# Patient Record
Sex: MALE | Race: WHITE | NOT HISPANIC OR LATINO | Employment: FULL TIME | URBAN - METROPOLITAN AREA
[De-identification: names, ages, dates, MRNs, and addresses within clinical notes are randomized per-mention and may not be internally consistent; named-entity substitution may affect disease eponyms.]

---

## 2018-08-26 ENCOUNTER — APPOINTMENT (OUTPATIENT)
Dept: CT IMAGING | Facility: HOSPITAL | Age: 48
End: 2018-08-26

## 2018-08-26 ENCOUNTER — HOSPITAL ENCOUNTER (EMERGENCY)
Facility: HOSPITAL | Age: 48
Discharge: HOME | End: 2018-08-26
Attending: EMERGENCY MEDICINE | Admitting: EMERGENCY MEDICINE

## 2018-08-26 ENCOUNTER — APPOINTMENT (OUTPATIENT)
Dept: GENERAL RADIOLOGY | Facility: HOSPITAL | Age: 48
End: 2018-08-26

## 2018-08-26 VITALS
SYSTOLIC BLOOD PRESSURE: 142 MMHG | BODY MASS INDEX: 24.69 KG/M2 | TEMPERATURE: 99 F | DIASTOLIC BLOOD PRESSURE: 92 MMHG | OXYGEN SATURATION: 96 % | RESPIRATION RATE: 16 BRPM | HEIGHT: 71 IN | HEART RATE: 83 BPM | WEIGHT: 176.4 LBS
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
## 2018-08-26 DIAGNOSIS — S22.31XA CLOSED FRACTURE OF ONE RIB OF RIGHT SIDE, INITIAL ENCOUNTER: ICD-10-CM

## 2018-08-26 PROCEDURE — 70450 CT HEAD/BRAIN W/O DYE: CPT

## 2018-08-26 PROCEDURE — 99283 EMERGENCY DEPT VISIT LOW MDM: CPT

## 2018-08-26 PROCEDURE — 71101 X-RAY EXAM UNILAT RIBS/CHEST: CPT

## 2018-08-26 RX ORDER — METAXALONE 800 MG/1
800 TABLET ORAL 3 TIMES DAILY PRN
Qty: 12 TABLET | Refills: 0 | Status: SHIPPED | OUTPATIENT
Start: 2018-08-26 | End: 2020-10-07

## 2018-08-26 RX ORDER — HYDROCODONE BITARTRATE AND ACETAMINOPHEN 5; 325 MG/1; MG/1
1 TABLET ORAL EVERY 4 HOURS PRN
Qty: 16 TABLET | Refills: 0 | Status: SHIPPED | OUTPATIENT
Start: 2018-08-26 | End: 2020-10-07

## 2019-11-08 ENCOUNTER — TELEPHONE (OUTPATIENT)
Dept: GASTROENTEROLOGY | Facility: CLINIC | Age: 49
End: 2019-11-08

## 2019-11-08 DIAGNOSIS — Z12.11 ENCOUNTER FOR SCREENING FOR MALIGNANT NEOPLASM OF COLON: Primary | ICD-10-CM

## 2020-10-03 ENCOUNTER — FLU SHOT (OUTPATIENT)
Dept: FAMILY MEDICINE CLINIC | Facility: CLINIC | Age: 50
End: 2020-10-03

## 2020-10-03 DIAGNOSIS — Z23 NEED FOR INFLUENZA VACCINATION: ICD-10-CM

## 2020-10-03 PROCEDURE — 90471 IMMUNIZATION ADMIN: CPT | Performed by: FAMILY MEDICINE

## 2020-10-03 PROCEDURE — 90686 IIV4 VACC NO PRSV 0.5 ML IM: CPT | Performed by: FAMILY MEDICINE

## 2020-10-07 ENCOUNTER — PREP FOR SURGERY (OUTPATIENT)
Dept: OTHER | Facility: HOSPITAL | Age: 50
End: 2020-10-07

## 2020-10-07 ENCOUNTER — OFFICE VISIT (OUTPATIENT)
Dept: GASTROENTEROLOGY | Facility: CLINIC | Age: 50
End: 2020-10-07

## 2020-10-07 ENCOUNTER — HOSPITAL ENCOUNTER (OUTPATIENT)
Dept: CT IMAGING | Facility: HOSPITAL | Age: 50
Discharge: HOME | End: 2020-10-07
Admitting: NURSE PRACTITIONER

## 2020-10-07 VITALS
RESPIRATION RATE: 16 BRPM | TEMPERATURE: 97.3 F | HEIGHT: 71 IN | SYSTOLIC BLOOD PRESSURE: 118 MMHG | HEART RATE: 75 BPM | BODY MASS INDEX: 27.51 KG/M2 | WEIGHT: 196.5 LBS | DIASTOLIC BLOOD PRESSURE: 70 MMHG | OXYGEN SATURATION: 99 %

## 2020-10-07 DIAGNOSIS — R19.5 LOOSE STOOLS: ICD-10-CM

## 2020-10-07 DIAGNOSIS — R10.32 ABDOMINAL PAIN, ACUTE, LEFT LOWER QUADRANT: ICD-10-CM

## 2020-10-07 DIAGNOSIS — Z87.19 HISTORY OF DIVERTICULITIS: ICD-10-CM

## 2020-10-07 DIAGNOSIS — R10.31 ABDOMINAL PAIN, ACUTE, RIGHT LOWER QUADRANT: ICD-10-CM

## 2020-10-07 DIAGNOSIS — Z12.11 COLON CANCER SCREENING: ICD-10-CM

## 2020-10-07 DIAGNOSIS — K57.90 DIVERTICULOSIS: Primary | ICD-10-CM

## 2020-10-07 DIAGNOSIS — K57.90 DIVERTICULOSIS: ICD-10-CM

## 2020-10-07 PROCEDURE — 74177 CT ABD & PELVIS W/CONTRAST: CPT

## 2020-10-07 PROCEDURE — 0 DIATRIZOATE MEGLUMINE & SODIUM PER 1 ML: Performed by: NURSE PRACTITIONER

## 2020-10-07 PROCEDURE — 99214 OFFICE O/P EST MOD 30 MIN: CPT | Performed by: NURSE PRACTITIONER

## 2020-10-07 PROCEDURE — 25010000002 IOPAMIDOL 61 % SOLUTION: Performed by: NURSE PRACTITIONER

## 2020-10-07 RX ORDER — VALSARTAN AND HYDROCHLOROTHIAZIDE 320; 25 MG/1; MG/1
1 TABLET, FILM COATED ORAL DAILY
COMMUNITY
Start: 2020-08-20 | End: 2023-02-16 | Stop reason: SDUPTHER

## 2020-10-07 RX ORDER — AMOXICILLIN AND CLAVULANATE POTASSIUM 875; 125 MG/1; MG/1
1 TABLET, FILM COATED ORAL 2 TIMES DAILY
Qty: 20 TABLET | Refills: 0 | Status: SHIPPED | OUTPATIENT
Start: 2020-10-07 | End: 2020-10-17

## 2020-10-07 RX ORDER — AMOXICILLIN AND CLAVULANATE POTASSIUM 875; 125 MG/1; MG/1
1 TABLET, FILM COATED ORAL 2 TIMES DAILY
Status: DISCONTINUED | OUTPATIENT
Start: 2020-10-07 | End: 2020-10-07

## 2020-10-07 RX ORDER — ASCORBIC ACID 500 MG
500 TABLET ORAL DAILY
COMMUNITY

## 2020-10-07 RX ADMIN — IOPAMIDOL 85 ML: 612 INJECTION, SOLUTION INTRAVENOUS at 10:18

## 2020-10-07 RX ADMIN — DIATRIZOATE MEGLUMINE AND DIATRIZOATE SODIUM 30 ML: 660; 100 LIQUID ORAL; RECTAL at 09:10

## 2020-10-07 NOTE — PROGRESS NOTES
"Follow-up      HPI  49-year-old male presents the office today for follow-up.  He was a patient in our previous practice and was last seen in office on 8/19/2019.  Has a history of left lower quadrant abdominal pain, diverticulosis, and was previously treated empirically for possible diverticulitis with Augmentin in August 2019. He has never had a colonoscopy.    The patient reports a new onset of left lower quadrant right lower quadrant abdominal pain that started \"a few days ago\" that is gradually worsening.  He describes the pain as crampy and states that it mimics the previous pain he had August 2019 with diverticulitis. Pain does not radiate. He denies any alleviating or worsening factors for the pain.  He denies fever.  He was prescribed Augmentin last August with these same symptoms with complete resolution of symptoms.  He has not eaten anything this morning and states he drank some orange juice around 6:40 AM.    He had planned to schedule a colonoscopy both for follow-up after his diverticulitis and for colon cancer screening, but has not yet completed this  He reports that over the past several months that his stools have been of loose consistency, but not watery.  He denies weight loss. He denies any nocturnal stools.  He denies any melena or hematochezia.  He does not take a daily fiber supplement, but states that he eats salads and cereal regularly.  He denies any family history of colon cancer.    Denies having any heartburn, reflux, nausea, vomiting, or dysphasia.    Review of Systems   Constitutional: Negative for appetite change, chills, diaphoresis, fatigue, fever and unexpected weight change.   HENT: Negative for dental problem, ear pain, mouth sores, rhinorrhea, sore throat and voice change.    Eyes: Negative for pain, redness and visual disturbance.   Respiratory: Negative for cough, chest tightness and wheezing.    Cardiovascular: Negative for chest pain, palpitations and leg swelling. "   Endocrine: Negative for cold intolerance, heat intolerance, polydipsia, polyphagia and polyuria.   Genitourinary: Negative for dysuria, frequency, hematuria and urgency.   Musculoskeletal: Negative for arthralgias, back pain, joint swelling, myalgias and neck pain.   Skin: Negative for rash.   Allergic/Immunologic: Negative for environmental allergies, food allergies and immunocompromised state.   Neurological: Negative for dizziness, seizures, weakness, numbness and headaches.   Hematological: Does not bruise/bleed easily.   Psychiatric/Behavioral: Negative for sleep disturbance. The patient is not nervous/anxious.           Problem List:  There is no problem list on file for this patient.      Medical History:    Past Medical History:   Diagnosis Date   • Hyperlipidemia    • Hypertension         Social History:    Social History     Socioeconomic History   • Marital status:      Spouse name: Not on file   • Number of children: Not on file   • Years of education: Not on file   • Highest education level: Not on file   Tobacco Use   • Smoking status: Never Smoker   • Smokeless tobacco: Never Used   Substance and Sexual Activity   • Alcohol use: No     Comment: very rarely   • Drug use: No   • Sexual activity: Defer       Family History:   Family History   Problem Relation Age of Onset   • Prostate cancer Father    • Colon cancer Neg Hx    • Colon polyps Neg Hx        Surgical History:   Past Surgical History:   Procedure Laterality Date   • HERNIA REPAIR           Current Outpatient Medications:   •  Multiple Vitamins-Minerals (MULTIVITAMIN ADULT PO), Take  by mouth., Disp: , Rfl:   •  rosuvastatin (CRESTOR) 10 MG tablet, Take 10 mg by mouth Daily., Disp: , Rfl:   •  valsartan-hydrochlorothiazide (DIOVAN-HCT) 320-25 MG per tablet, Take 1 tablet by mouth Daily., Disp: , Rfl:   •  vitamin C (ASCORBIC ACID) 500 MG tablet, Take 500 mg by mouth Daily., Disp: , Rfl:   •  loperamide (IMODIUM A-D) 2 MG tablet, Take  two tabs now and then one after each loose stool, up to 8 doses in 24 hours, Disp: 24 tablet, Rfl: 0    Current Facility-Administered Medications:   •  amoxicillin-clavulanate (AUGMENTIN) 875-125 MG per tablet 1 tablet, 1 tablet, Oral, BID, Neftali NicoleTERRI    Allergies: No Known Allergies     The following portions of the patient's history were reviewed and updated as appropriate: allergies, current medications, past family history, past medical history, past social history, past surgical history and problem list.    Vitals:    10/07/20 0818   BP: 118/70   Pulse: 75   Resp: 16   Temp: 97.3 °F (36.3 °C)   SpO2: 99%       Physical Exam  Vitals signs reviewed.   Constitutional:       Appearance: Normal appearance.   Pulmonary:      Effort: Pulmonary effort is normal. No respiratory distress.   Abdominal:      General: Abdomen is flat. Bowel sounds are normal. There is no distension.      Palpations: Abdomen is soft. There is no mass.      Tenderness: There is abdominal tenderness. There is no guarding or rebound.      Comments: Mild to moderate tenderness noted on palpation of left lower quadrant right lower quadrant, with discomfort being more intense in the left lower quadrant.   Musculoskeletal: Normal range of motion.   Skin:     General: Skin is warm and dry.   Neurological:      General: No focal deficit present.      Mental Status: He is alert and oriented to person, place, and time.   Psychiatric:         Mood and Affect: Mood normal.         Behavior: Behavior normal.         Thought Content: Thought content normal.         Judgment: Judgment normal.         Assessment/ Plan  Jasbir was seen today for follow-up.    Diagnoses and all orders for this visit:    Diverticulosis  -     CT Abdomen Pelvis With Contrast; Future  -     amoxicillin-clavulanate (AUGMENTIN) 875-125 MG per tablet 1 tablet    Abdominal pain, acute, left lower quadrant  -     CT Abdomen Pelvis With Contrast; Future  -      amoxicillin-clavulanate (AUGMENTIN) 875-125 MG per tablet 1 tablet    Abdominal pain, acute, right lower quadrant  -     CT Abdomen Pelvis With Contrast; Future  -     amoxicillin-clavulanate (AUGMENTIN) 875-125 MG per tablet 1 tablet    Colon cancer screening    Loose stools  -     CT Abdomen Pelvis With Contrast; Future    History of diverticulitis  -     CT Abdomen Pelvis With Contrast; Future  -     amoxicillin-clavulanate (AUGMENTIN) 875-125 MG per tablet 1 tablet         No follow-ups on file.  1.  For further evaluation of acute left lower quadrant and acute right lower quadrant abdominal pain we will obtain a stat CT scan of the abdomen and pelvis today.  We will contact you with results once available.    2.  Due to your history of diverticulitis, we will place you on a course of Augmentin.  You will take 1 tablet twice daily x10 days.  This prescription has been sent to your pharmacy.    3.  For ongoing management of diverticulosis, we recommend the addition of a daily fiber supplement such as FiberCon tablet or his generic equivalent.  We recommend starting off with 1 tablet a day with a full 8 ounce glass of water x1 week, then may increase to 2 tablets daily based upon how your bowel habits respond.    4.  We will plan to schedule colonoscopy in 6 to 8 weeks both for follow-up due to your history of diverticulosis and diverticulitis as well as for colon cancer screening.    5.  Plan for office follow-up with nurse practitioner 4 weeks after procedure to discuss results and plan of care moving forward.      Discussion:  The patient has had a history of left lower quadrant diverticulitis, and states that his current symptoms mimic the symptoms he experienced August 2019.  He was placed on a course of Augmentin which resolved his symptoms.  He is due for his screening colonoscopy and states that due to COVID-19, he has postponed this.  We will obtain a stat CT scan of the abdomen and pelvis today for  further evaluation of left lower quadrant and right lower quadrant abdominal pain and we will place the patient on a course of Augmentin twice daily x10 days for treatment.  We will plan to proceed with colonoscopy in 6 to 8 weeks for colon cancer screening as well as for further evaluation of left lower quadrant abdominal pain right lower quadrant abdominal pain, and history of diverticulitis in conjunction with loose stools.  Plan for office follow-up with nurse practitioner 4 weeks after procedure.  Patient verbalized understanding of above.  All questions answered and support provided.

## 2020-10-07 NOTE — PATIENT INSTRUCTIONS
1.  For further evaluation of acute left lower quadrant and acute right lower quadrant abdominal pain we will obtain a stat CT scan of the abdomen and pelvis today.  We will contact you with results once available.    2.  Due to your history of diverticulitis, we will place you on a course of Augmentin.  You will take 1 tablet twice daily x10 days.  This prescription has been sent to your pharmacy.    3.  For ongoing management of diverticulosis, we recommend the addition of a daily fiber supplement such as FiberCon tablet or his generic equivalent.  We recommend starting off with 1 tablet a day with a full 8 ounce glass of water x1 week, then may increase to 2 tablets daily based upon how your bowel habits respond.    4.  We will plan to schedule colonoscopy in 6 to 8 weeks both for follow-up due to your history of diverticulosis and diverticulitis as well as for colon cancer screening.    5.  Plan for office follow-up with nurse practitioner 4 weeks after procedure to discuss results and plan of care moving forward.

## 2020-10-26 ENCOUNTER — TRANSCRIBE ORDERS (OUTPATIENT)
Dept: ADMINISTRATIVE | Facility: HOSPITAL | Age: 50
End: 2020-10-26

## 2020-10-26 DIAGNOSIS — I67.1 BRAIN ANEURYSM: Primary | ICD-10-CM

## 2020-10-26 DIAGNOSIS — K76.9 LIVER LESION: ICD-10-CM

## 2020-10-26 DIAGNOSIS — N28.1 BILATERAL RENAL CYSTS: ICD-10-CM

## 2020-11-06 ENCOUNTER — CLINICAL SUPPORT (OUTPATIENT)
Dept: GENETICS | Facility: HOSPITAL | Age: 50
End: 2020-11-06

## 2020-11-06 DIAGNOSIS — Q61.3 POLYCYSTIC KIDNEY DISEASE: Primary | ICD-10-CM

## 2020-11-09 ENCOUNTER — TRANSCRIBE ORDERS (OUTPATIENT)
Dept: ADMINISTRATIVE | Facility: HOSPITAL | Age: 50
End: 2020-11-09

## 2020-11-09 DIAGNOSIS — K76.89 LIVER CYST: ICD-10-CM

## 2020-11-09 DIAGNOSIS — I67.1 BRAIN ANEURYSM: Primary | ICD-10-CM

## 2020-11-09 DIAGNOSIS — N28.1 BILATERAL RENAL CYSTS: ICD-10-CM

## 2020-11-09 NOTE — PROGRESS NOTES
Jasbir Banks is a 50 year old male who was referred was genetic counseling to discuss genetic testing for Autosomal Dominant Polycystic Kidney Disease (ADPKD).  Genetic counseling was provided via telehealth.  Mr. Banks was recently identified to have multiple bilateral renal cysts, as well as liver cysts. Mr. Banks was interested in discussing genetic testing options and the information that would be gained by genetic testing.  Genetic testing was ordered through the [facility] PKD panel, which analyzes seven genes associated with polycystic kidney disease (GANAB, HNF1B, PKD1, PKD2, PKHD1, PRKCSH, TSC2).  A saliva sample collection kit was ordered to be sent to Mr. Banks, and once the sample is sent in results are expected in 4-6 weeks.      FAMILY HISTORY:    No known family history of PKD or individuals with renal or liver cysts.        GENETIC COUNSELING:  We discussed that autosomal dominant polycystic kidney disease (ADPKD) is inherited in an autosomal dominant fashion and is an adult disorder characterized by bilateral renal cysts, cysts in other organs, vascular abnormalities, mitral valve prolapse, and abdominal wall hernias.  Typically, the kidney’s appear enlarged in individuals with AKPKD.  Approximately 10% of cases of ADPKD are de nadeem, meaning that they are not inherited from a parent. We discussed the variability of ADPKD in both the age of presentation as well as the severity of symptoms.   We discussed genetic testing options.  PKD1 and PKD2 are the genes associated with the majority of cases of ADPKD.  Studies have shown that about 7-9% of families with a clinical diagnosis of ADPKD do not have a mutation identified in either gene, therefore negative genetic testing does not rule out a diagnosis of ADPKD.  It is unclear whether the group without an identified mutation in PKD1 or PKD2 is due to the possibility of an unidentified gene that may be causative or due to technical limitations in  testing for the currently identified genes.  Therefore, if Mr. Banks’s genetic test results were negative, it would not rule out the diagnosis of ADPKD.  If a mutation is identified, Mr. Banks’s children could then be tested for that specific mutation.  If his children tested negative for an identified mutation, this would be considered a “true negative” result.   In that case, Mr. Banks’s children would not need to be followed by renal ultrasound.   If Mr. Banks’s genetic testing does not identify a mutation, genetic testing would not be indicated for his children, however they would still need to be followed based on his history.    PLAN:  Mr. Banks elected to pursue genetic testing through the comprehensive [facility] PKD panel.   He will be contacted by telephone once results are available.  Genetic counseling remains available to Mr. Banks and he is welcome to contact us with any questions or concerns.        Mitzi Merino MS, [facility] [place], [facility]  Licensed Certified Genetic Counselor

## 2020-11-11 ENCOUNTER — APPOINTMENT (OUTPATIENT)
Dept: ULTRASOUND IMAGING | Facility: HOSPITAL | Age: 50
End: 2020-11-11

## 2020-11-11 ENCOUNTER — APPOINTMENT (OUTPATIENT)
Dept: MRI IMAGING | Facility: HOSPITAL | Age: 50
End: 2020-11-11

## 2020-11-11 ENCOUNTER — HOSPITAL ENCOUNTER (OUTPATIENT)
Dept: MRI IMAGING | Facility: HOSPITAL | Age: 50
Discharge: HOME | End: 2020-11-11
Admitting: FAMILY MEDICINE

## 2020-11-11 DIAGNOSIS — I67.1 BRAIN ANEURYSM: ICD-10-CM

## 2020-11-11 PROCEDURE — 70544 MR ANGIOGRAPHY HEAD W/O DYE: CPT

## 2020-11-18 ENCOUNTER — HOSPITAL ENCOUNTER (OUTPATIENT)
Dept: ULTRASOUND IMAGING | Facility: HOSPITAL | Age: 50
Discharge: HOME | End: 2020-11-18

## 2020-11-18 DIAGNOSIS — K76.89 LIVER CYST: ICD-10-CM

## 2020-11-18 DIAGNOSIS — N28.1 BILATERAL RENAL CYSTS: ICD-10-CM

## 2020-11-18 PROCEDURE — 76705 ECHO EXAM OF ABDOMEN: CPT

## 2020-11-18 PROCEDURE — 76775 US EXAM ABDO BACK WALL LIM: CPT

## 2020-12-10 ENCOUNTER — LAB REQUISITION (OUTPATIENT)
Dept: LAB | Facility: HOSPITAL | Age: 50
End: 2020-12-10

## 2020-12-10 DIAGNOSIS — Z00.00 ENCOUNTER FOR GENERAL ADULT MEDICAL EXAMINATION WITHOUT ABNORMAL FINDINGS: ICD-10-CM

## 2020-12-12 PROCEDURE — U0004 COV-19 TEST NON-CDC HGH THRU: HCPCS | Performed by: INTERNAL MEDICINE

## 2020-12-14 LAB — SARS-COV-2 RNA RESP QL NAA+PROBE: NOT DETECTED

## 2020-12-15 ENCOUNTER — OUTSIDE FACILITY SERVICE (OUTPATIENT)
Dept: GASTROENTEROLOGY | Facility: CLINIC | Age: 50
End: 2020-12-15

## 2020-12-15 ENCOUNTER — LAB REQUISITION (OUTPATIENT)
Dept: LAB | Facility: HOSPITAL | Age: 50
End: 2020-12-15

## 2020-12-15 DIAGNOSIS — K57.90 DIVERTICULOSIS OF INTESTINE, PART UNSPECIFIED, WITHOUT PERFORATION OR ABSCESS WITHOUT BLEEDING: ICD-10-CM

## 2020-12-15 PROCEDURE — 45380 COLONOSCOPY AND BIOPSY: CPT | Performed by: INTERNAL MEDICINE

## 2020-12-15 PROCEDURE — 88305 TISSUE EXAM BY PATHOLOGIST: CPT | Performed by: INTERNAL MEDICINE

## 2020-12-16 LAB
CYTO UR: NORMAL
LAB AP CASE REPORT: NORMAL
LAB AP CLINICAL INFORMATION: NORMAL
PATH REPORT.FINAL DX SPEC: NORMAL
PATH REPORT.GROSS SPEC: NORMAL

## 2021-01-12 ENCOUNTER — DOCUMENTATION (OUTPATIENT)
Dept: GENETICS | Facility: HOSPITAL | Age: 51
End: 2021-01-12

## 2021-01-15 NOTE — PROGRESS NOTES
Jasbir Banks is a 50-year-old male who was referred was genetic counseling to discuss genetic testing for Autosomal Dominant Polycystic Kidney Disease (ADPKD).  Genetic counseling was provided via telehealth.  Mr. Banks was recently identified to have bilateral renal cysts, as well as a liver cyst. Mr. Banks was interested in discussing genetic testing options and the information that would be gained by genetic testing.  Genetic testing was ordered through the [facility] PKD panel, which analyzes seven genes associated with polycystic kidney disease (GANAB, HNF1B, PKD1, PKD2, PKHD1, PRKCSH, TSC2).  Genetic testing was negative for known pathogenic mutations in any of the genes analyzed, however a variant of uncertain significance (VUS) was reported in the PKD1 gene (reviewed below in the results section).  These results were discussed with Mr. Banks on 1/12/21.      FAMILY HISTORY:  No known family history of PKD or individuals known to have renal or liver cysts.        GENETIC COUNSELING:  We discussed that autosomal dominant polycystic kidney disease (ADPKD) is is an adult onset disorder characterized by bilateral renal cysts, cysts in other organs, vascular abnormalities, mitral valve prolapse, and abdominal wall hernias.  Typically, the kidneys appear enlarged in individuals with AKPKD.  Approximately 10% of cases of ADPKD are de nadeem, meaning that the causative mutation is not inherited from a parent, but the result of a new mutation in the egg or sperm from a parent, or a mutation arising early in embryogenesis. We discussed the variability of ADPKD in both the age of presentation as well as the severity of symptoms.  We discussed genetic testing options.  PKD1 and PKD2 are the genes associated with the majority of cases of ADPKD.  Studies have shown that about 7-9% of families with a clinical diagnosis of ADPKD do not have a mutation identified in either gene, therefore negative genetic testing does  not rule out a diagnosis of ADPKD.  It is unclear whether the group without an identified mutation is due to the possibility of other unidentified genes that may be causative, or due to technical limitations in testing for the currently identified genes.   A clinical diagnosis is established in someone over age 40 with two or more cysts in each kidney if they also have a known affected first degree relative, or an identified pathogenic mutation in an ADPKD gene.   Mr. Banks meets the cyst criteria based on number, but either a positive family history or an identified pathogenic mutation would be needed to establish a clinical diagnosis.  If a mutation is identified, relatives can then be tested for that specific mutation to determine if they have inherited it. When genetic testing is negative in an affected individual, genetic testing is not indicated for relatives, however screening recommendations may still be made based on family history.    GENETIC TESTING:  The risks, benefits and limitations of genetic testing and implications for clinical management following testing were reviewed.  DNA test results can influence decisions regarding screening and testing for relatives.  Genetic testing can have psychological implications for both individuals and families.  Also discussed was the possibility of employment and insurance discrimination based on genetic test results and the laws in place to prevent this (ANGEL).     We discussed multigene panel testing, which would involve testing six genes associated with polycystic kidney disease. The benefits and limitations of genetic testing were discussed and Mr. Banks decided to pursue testing via the panel. The implications of a positive or negative test result were discussed. We discussed the possibility that, in some cases, genetic test results may be uninformative or may be ambiguous due to the identification of a genetic variant of uncertain significance (VUS).  VUSs are differences within genes that may or may not impact the function of a gene.  VUSs are relatively frequently reported through multigene panel testing, given the number of genes analyzed and the presence of genetic variation in the population.    RESULTS:  A variant of uncertain significance (VUS) was identified in the PKD1 gene (c.[id number] A>G).   A VUS is a difference within a gene for which there is not sufficient evidence to classify as benign or pathogenic.  The identification of a VUS does not confirm a diagnosis and VUSs are not clinically actionable results. There are not alternative interpretations of this specific VUS in ClinVar, the [employer] database that tracks variant classification.  [facility] does not have a calculated percentage of PKD1 VUSs that are reclassified as pathogenic or likely pathogenic.  [facility] does offer the option of parental studies to determine if the VUS is de nadeem or was inherited from a parent.  [facility] would offer this single site testing at no cost to Mr. Banks’s parents, and this could be done through a buccal sample that could be mailed directly to them.  If a VUS is found to be de nadeem, it is considered moderate strength criteria for pathogenicity.   However, reclassification of a VUS depends on multiple factors and it can often take significant time for a reclassification to occur.  [facility] does follow the AMP/ACMG guidelines for variant classification.  If a reclassification is made, a new report will be released by [facility], and I would contact Mr. Banks at that time.  Testing Mr. Banks’s children or brother for the identified VUS is not recommended, since the presence or absence or a VUS should not be used in guiding management.        PLAN:  Genetic counseling remains available to Mr. Banks and he is welcome to contact us with any questions or concerns.  I would be happy to help in the coordination of parental testing, should Mr. Banks’s family decide to pursue that  option.      Mitzi Merino MS, [facility] [place], [facility]  Licensed Certified Genetic Counselor     Cc: Jasbir Rodrigues MD

## 2021-02-02 ENCOUNTER — OFFICE VISIT (OUTPATIENT)
Dept: GASTROENTEROLOGY | Facility: CLINIC | Age: 51
End: 2021-02-02

## 2021-02-02 VITALS
OXYGEN SATURATION: 99 % | TEMPERATURE: 97.8 F | HEART RATE: 58 BPM | BODY MASS INDEX: 27.72 KG/M2 | WEIGHT: 198 LBS | HEIGHT: 71 IN | DIASTOLIC BLOOD PRESSURE: 72 MMHG | SYSTOLIC BLOOD PRESSURE: 130 MMHG

## 2021-02-02 DIAGNOSIS — Z12.11 COLON CANCER SCREENING: ICD-10-CM

## 2021-02-02 DIAGNOSIS — Z87.19 HISTORY OF DIVERTICULITIS: ICD-10-CM

## 2021-02-02 DIAGNOSIS — Z86.010 HX OF ADENOMATOUS COLONIC POLYPS: ICD-10-CM

## 2021-02-02 DIAGNOSIS — K57.90 DIVERTICULOSIS: Primary | ICD-10-CM

## 2021-02-02 DIAGNOSIS — K58.0 IRRITABLE BOWEL SYNDROME WITH DIARRHEA: ICD-10-CM

## 2021-02-02 PROCEDURE — 99214 OFFICE O/P EST MOD 30 MIN: CPT | Performed by: NURSE PRACTITIONER

## 2021-02-02 RX ORDER — DICYCLOMINE HYDROCHLORIDE 10 MG/1
10 CAPSULE ORAL 3 TIMES DAILY PRN
Qty: 90 CAPSULE | Refills: 5 | Status: SHIPPED | OUTPATIENT
Start: 2021-02-02

## 2021-02-02 NOTE — PROGRESS NOTES
Chief Complaint   Patient presents with   • Follow-up     IBS-D, diverticulosis, follow-up after colonoscopy             History of Present Illness  50-year-old male presents the office today for follow-up.  He was last seen in office on 10/7/2020.  He has a history of left lower quadrant abdominal pain, diverticulosis and possible diverticulitis.    Colonoscopy on 12/15/2020 demonstrated diverticulosis of the sigmoid and descending colon, granular mucosa in the sigmoid colon, one 2 mm polyp in the cecum, one 4 mm polyp in ascending colon, and nonbleeding internal hemorrhoids.  Terminal ileum biopsy was unremarkable.  Cecum biopsy revealed a tubular adenoma.  Ascending colon biopsy, colon biopsy, and sigmoid colon biopsy were all unremarkable.    He has had a history of diverticulitis, noted on CT scan October 2020.  He continues to follow a diet where he and he avoids nuts, seeds, and popcorn.  He reports having an average of 1-2 loose stools on most days with some accompanying fecal urgency and intermittent abdominal cramping.  He does take a daily fiber supplement.  Denies any melena or hematochezia.  His weight is stable and his appetite is good.    He denies having any heartburn, reflux, nausea, vomiting, or dysphagia.    He has a history of polycystic kidney disease and follows with nephrology as well as his PCP.  His last bilateral renal ultrasound was performed November 2020 and revealed multiple bilateral renal cysts.  He reports plans for ongoing monitoring and will have some repeat scans this summer.  He was also noted to have a 6 cm liver cyst on his liver ultrasound as noted below.    Review of Systems   HENT: Negative for trouble swallowing.    Cardiovascular: Negative for chest pain.   Gastrointestinal: Positive for diarrhea. Negative for abdominal distention, abdominal pain, anal bleeding, blood in stool, constipation, nausea, rectal pain and vomiting.         Result Review :      SCANNED -  "COLONOSCOPY (12/15/2020)  Tissue Pathology Exam (12/15/2020 11:48)  Office Visit with Nicole Lindsay APRN (10/07/2020)  US Liver (11/18/2020 15:30)  CT Abdomen Pelvis With Contrast (10/07/2020 10:23)  US Renal Bilateral (11/18/2020 15:30)      Vital Signs:   /72   Pulse 58   Temp 97.8 °F (36.6 °C)   Ht 180.3 cm (70.98\")   Wt 89.8 kg (198 lb)   SpO2 99%   BMI 27.63 kg/m²     Body mass index is 27.63 kg/m².     Physical Exam  Vitals signs reviewed.   Constitutional:       Appearance: Normal appearance.   Pulmonary:      Effort: Pulmonary effort is normal.   Abdominal:      General: Abdomen is flat. Bowel sounds are normal. There is no distension.      Palpations: Abdomen is soft.      Tenderness: There is no guarding or rebound.   Musculoskeletal: Normal range of motion.   Skin:     General: Skin is warm and dry.   Neurological:      General: No focal deficit present.      Mental Status: He is alert and oriented to person, place, and time.   Psychiatric:         Mood and Affect: Mood normal.         Behavior: Behavior normal.         Thought Content: Thought content normal.         Judgment: Judgment normal.             Assessment and Plan      Diagnoses and all orders for this visit:    1. Diverticulosis (Primary)    2. Irritable bowel syndrome with diarrhea    3. History of diverticulitis    4. Hx of adenomatous colonic polyps    5. Colon cancer screening    Other orders  -     dicyclomine (BENTYL) 10 MG capsule; Take 1 capsule by mouth 3 (Three) Times a Day As Needed (abdominal pain/diarrhea, fecal urgency).  Dispense: 90 capsule; Refill: 5  -     riFAXIMin (Xifaxan) 550 MG tablet; Take 1 tablet by mouth 3 (Three) Times a Day for 14 days.  Dispense: 42 tablet; Refill: 0           Follow Up   Return in about 7 months (around 9/2/2021).     Patient Instructions   1.  For irritable bowel with diarrhea we will place you on a 2-week course of Xifaxan.  You will take 1 tablet 3 times daily x14 days.  " Coupon co-pay card provided.  Please visit [facility] [employer] to fill out the required form to receive your medication for $0.    2.  Additionally for irritable bowel with diarrhea, a prescription for dicyclomine 10 mg tablets has been sent to your pharmacy.  You may take 1 tablet up to 3 times daily as needed for fecal urgency, diarrhea/loose stools, and abdominal cramping.  This medication works well if taken 1 hour before meals.    3.  For diverticulosis we recommend maintaining a high-fiber diet and continuing your fiber supplement.    4.  Due to your history of colon polyps, we have placed your electronic reminder system for your next surveillance colonoscopy which will be due on 12/15/2025.    5.  Please continue to follow-up with both your PCP and nephrologist for your polycystic kidney disease monitoring.    6.  Plan for 7-month follow-up for reassessment of symptoms, or sooner should symptoms worsen or fail to improve.  Hopefully by the time of your appointment, you will have had follow-up with your nephrologist and PCP for ongoing monitoring of your polycystic kidney disease.        Discussion:    Colonoscopy findings and pathology reviewed with patient today during his follow-up visit.  For IBS-D we will place him on a course of Xifaxan and have also provided him with a prescription for dicyclomine to be used intermittently for abdominal cramping, fecal urgency, and loose stools.  Patient to continue daily fiber supplement.  Due to his history of adenomatous colon polyps, he was placed in recall for his next surveillance colonoscopy December 2025.  We will plan for follow-up visit in 6 months for reassessment of symptoms, or sooner should symptoms worsen or fail to improve.  Patient verbalized understand above plan of care and is in agreement.  All questions answered and support provided.

## 2021-02-02 NOTE — PATIENT INSTRUCTIONS
1.  For irritable bowel with diarrhea we will place you on a 2-week course of Xifaxan.  You will take 1 tablet 3 times daily x14 days.  Coupon co-pay card provided.  Please visit [facility] [employer] to fill out the required form to receive your medication for $0.    2.  Additionally for irritable bowel with diarrhea, a prescription for dicyclomine 10 mg tablets has been sent to your pharmacy.  You may take 1 tablet up to 3 times daily as needed for fecal urgency, diarrhea/loose stools, and abdominal cramping.  This medication works well if taken 1 hour before meals.    3.  For diverticulosis we recommend maintaining a high-fiber diet and continuing your fiber supplement.    4.  Due to your history of colon polyps, we have placed your electronic reminder system for your next surveillance colonoscopy which will be due on 12/15/2025.    5.  Please continue to follow-up with both your PCP and nephrologist for your polycystic kidney disease monitoring.    6.  Plan for 7-month follow-up for reassessment of symptoms, or sooner should symptoms worsen or fail to improve.  Hopefully by the time of your appointment, you will have had follow-up with your nephrologist and PCP for ongoing monitoring of your polycystic kidney disease.

## 2021-03-12 ENCOUNTER — IMMUNIZATION (OUTPATIENT)
Dept: VACCINE CLINIC | Facility: HOSPITAL | Age: 51
End: 2021-03-12

## 2021-03-12 PROCEDURE — 91300 HC SARSCOV02 VAC 30MCG/0.3ML IM: CPT | Performed by: OBSTETRICS & GYNECOLOGY

## 2021-03-12 PROCEDURE — 0001A: CPT | Performed by: OBSTETRICS & GYNECOLOGY

## 2021-04-02 ENCOUNTER — IMMUNIZATION (OUTPATIENT)
Dept: VACCINE CLINIC | Facility: HOSPITAL | Age: 51
End: 2021-04-02

## 2021-04-02 PROCEDURE — 91300 HC SARSCOV02 VAC 30MCG/0.3ML IM: CPT | Performed by: OBSTETRICS & GYNECOLOGY

## 2021-04-02 PROCEDURE — 0002A: CPT | Performed by: OBSTETRICS & GYNECOLOGY

## 2021-07-13 ENCOUNTER — TRANSCRIBE ORDERS (OUTPATIENT)
Dept: ADMINISTRATIVE | Facility: HOSPITAL | Age: 51
End: 2021-07-13

## 2021-07-13 DIAGNOSIS — N28.1 RENAL CYST: Primary | ICD-10-CM

## 2021-08-09 ENCOUNTER — HOSPITAL ENCOUNTER (OUTPATIENT)
Dept: ULTRASOUND IMAGING | Facility: HOSPITAL | Age: 51
Discharge: HOME | End: 2021-08-09
Admitting: INTERNAL MEDICINE

## 2021-08-09 DIAGNOSIS — N28.1 RENAL CYST: ICD-10-CM

## 2021-08-09 PROCEDURE — 76775 US EXAM ABDO BACK WALL LIM: CPT

## 2021-08-13 DIAGNOSIS — K57.92 DIVERTICULITIS: Primary | ICD-10-CM

## 2021-08-13 RX ORDER — CIPROFLOXACIN 500 MG/1
500 TABLET, FILM COATED ORAL 2 TIMES DAILY
Qty: 20 TABLET | Refills: 0 | Status: SHIPPED | OUTPATIENT
Start: 2021-08-13 | End: 2021-09-07

## 2021-08-13 RX ORDER — METRONIDAZOLE 500 MG/1
500 TABLET ORAL 3 TIMES DAILY
Qty: 30 TABLET | Refills: 0 | Status: SHIPPED | OUTPATIENT
Start: 2021-08-13 | End: 2021-09-07

## 2021-09-07 ENCOUNTER — OFFICE VISIT (OUTPATIENT)
Dept: GASTROENTEROLOGY | Facility: CLINIC | Age: 51
End: 2021-09-07

## 2021-09-07 VITALS
SYSTOLIC BLOOD PRESSURE: 126 MMHG | WEIGHT: 190 LBS | TEMPERATURE: 97.5 F | HEART RATE: 59 BPM | OXYGEN SATURATION: 98 % | HEIGHT: 70 IN | DIASTOLIC BLOOD PRESSURE: 80 MMHG | BODY MASS INDEX: 27.2 KG/M2

## 2021-09-07 DIAGNOSIS — Z87.19 HISTORY OF DIVERTICULITIS: ICD-10-CM

## 2021-09-07 DIAGNOSIS — K57.90 DIVERTICULOSIS: Primary | Chronic | ICD-10-CM

## 2021-09-07 DIAGNOSIS — K58.0 IRRITABLE BOWEL SYNDROME WITH DIARRHEA: Chronic | ICD-10-CM

## 2021-09-07 PROCEDURE — 99213 OFFICE O/P EST LOW 20 MIN: CPT | Performed by: NURSE PRACTITIONER

## 2021-09-07 NOTE — PATIENT INSTRUCTIONS
1.  For diverticulosis and your history of diverticulitis we recommend continuing your daily fiber supplement and avoiding foods such as corn, popcorn, and nuts.    2.  For colon cancer screening due to your history of colon polyps should be due for your next surveillance colonoscopy December 2025 and we have placed you in recall accordingly.    3.  For IBS-D and intermittent diarrhea, you may continue dicyclomine as prescribed.    4.  Recommend office follow-up as needed.

## 2021-09-07 NOTE — PROGRESS NOTES
Chief Complaint   Patient presents with   • Follow-up     History of diverticulitis, diverticulosis, IBS-D           History of Present Illness  50-year-old male presents the office today for follow-up.  He was last seen in office on 2/2/2021.  He has a history of diverticulosis and diverticulitis, adenomatous colon polyps, nonbleeding internal hemorrhoids, liver cyst, and polycystic kidney disease.    He maintains a high-fiber diet due to his history of diverticulosis and diverticulitis.  His last episode of diverticulitis occurred in early August and he recovered well with use of Cipro and Flagyl.  This is the third episode he has had in his lifetime, with each episode occurring approximately 12 months apart.  Bowel habits are generally more on the loose side of the spectrum.  He denies any constipation.  He denies any melena or hematochezia.  He reports restarting his daily fiber supplement.  He also has a prescription for dicyclomine that he uses intermittently for IBS-D symptoms.  He has previously tried Xifaxan.  Due to his history of colon polyps, he will be due for his next surveillance colonoscopy December 2025.  He reports some intentional weight loss.    He follows with his PCP and nephrology for management of polycystic kidney disease.  His last renal ultrasound was performed November 2020 revealing multiple bilateral renal cysts.  Plan was for repeat scan this past summer for ongoing monitoring.    Review of Systems   Constitutional: Positive for unexpected weight change. Negative for fever.   HENT: Negative for trouble swallowing.    Cardiovascular: Negative for chest pain.   Gastrointestinal: Negative for abdominal distention, abdominal pain, anal bleeding, blood in stool, constipation, diarrhea, nausea, rectal pain and vomiting.      Result Review :       Office Visit with Nicole Lindsay APRN (02/02/2021)  SCANNED - COLONOSCOPY (12/15/2020)  Tissue Pathology Exam (12/15/2020 11:48)    Vital Signs:  "  /80   Pulse 59   Temp 97.5 °F (36.4 °C)   Ht 177.8 cm (70\")   Wt 86.2 kg (190 lb)   SpO2 98%   BMI 27.26 kg/m²     Body mass index is 27.26 kg/m².     Physical Exam  Vitals reviewed.   Constitutional:       Appearance: Normal appearance.   Pulmonary:      Effort: Pulmonary effort is normal. No respiratory distress.   Abdominal:      General: Abdomen is flat. Bowel sounds are normal. There is no distension.      Palpations: Abdomen is soft. There is no mass.      Tenderness: There is no abdominal tenderness. There is no guarding.   Musculoskeletal:         General: Normal range of motion.   Skin:     General: Skin is warm and dry.   Neurological:      General: No focal deficit present.      Mental Status: He is alert and oriented to person, place, and time.   Psychiatric:         Mood and Affect: Mood normal.         Behavior: Behavior normal.         Thought Content: Thought content normal.         Judgment: Judgment normal.       Assessment and Plan    Diagnoses and all orders for this visit:    1. Diverticulosis (Primary)    2. History of diverticulitis    3. Irritable bowel syndrome with diarrhea           Patient Instructions   1.  For diverticulosis and your history of diverticulitis we recommend continuing your daily fiber supplement and avoiding foods such as corn, popcorn, and nuts.    2.  For colon cancer screening due to your history of colon polyps should be due for your next surveillance colonoscopy December 2025 and we have placed you in recall accordingly.    3.  For IBS-D and intermittent diarrhea, you may continue dicyclomine as prescribed.    4.  Recommend office follow-up as needed.     Discussion:    Discussed efforts towards reducing incidence of diverticulitis.  Patient recovered well from previous episode with Cipro and Flagyl.  He has had a total of 3 episodes, each occurring approximately 12 months apart.  We discussed dietary interventions.  Dicyclomine may be used as needed " for intermittent IBS-D symptoms.  Neck surveillance colonoscopy due December 2025 due to history of colon polyps.  Recommend office follow-up as needed.  Patient verbalized understand above plan of care and is in agreement.  All questions answered and support provided.     EMR Dragon/Transcription Disclaimer:  This document has been Dictated utilizing Dragon dictation.

## 2022-03-22 ENCOUNTER — DOCUMENTATION (OUTPATIENT)
Dept: OTHER | Facility: HOSPITAL | Age: 52
End: 2022-03-22

## 2022-03-22 NOTE — PROGRESS NOTES
Jasbir Banks, a 51-year-old male, was originally referred for genetic counseling in to coordinate testing for Autosomal Dominant Polycystic Kidney Disease (ADPKD). At this time, genetic testing was ordered through [facility] by genetic counselor, Mitzi Merino. 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 VUSs are changes in DNA that may or may not affect the function of the gene.  VUSs are frequently identified through multigene panel testing, given the number of genes being evaluated and the presence of genetic variation in the population. The majority (estimated to be >90%) of VUS’s are eventually reclassified as benign gene changes. The laboratories that perform genetic testing work to reclassify the VUS and send out an amended report if and when a VUS is reclassified.    UPDATED TEST RESULTS: This VUS in PKD1 has been reclassified to a likely pathogenic variant, meaning that it is now considered a positive result and is consistent with a genetic diagnosis of ADPKD.  This reclassification was made after Mr. Banks’s parents both tested negative for the variant, indicating that it was de nadeem, or a new variant in Mr. Banks. Given that Mr. Banks already had a clinical diagnosis of ADPKD, this finding is unlikely to impact his current management. All screening recommendations for an individual with ADPKD are outlined below under “Surveillance.”     Based on this result, Mr. Banks’s children would each have a 50% chance to have inherited this variant. There have also been reports of germline mosaicism in an unaffected parent of an individual with ADPKD. This means that just some of the germ cells (egg or sperm) carry the pathogenic/likely pathogenic variant and the siblings of someone with ADPKD could have inherited the variant as well. Therefore, testing would be appropriate for Mr. Banks’s brother, given the small possibility that he also could have  inherited the variant due to germline mosaicism.     We would be happy to see family members who live in the area in our clinic to further discuss this information and testing options. Testing is not recommended for individuals under the age of 18, due to this being an adult-onset condition. Relatives can call our office at [phone] for assistance in scheduling an appointment; however, a physician referral to our office will prompt our coordinator to contact patients to schedule an appointment.  For family members who live elsewhere, there are genetic counselors at most [facility]. They can find a genetic counselor by visiting the National Society of Genetic Counselors website at www.nsgc.org.  Relatives would need a copy of Mr. Banks’s genetic test result to ensure they were being tested for the correct gene.    MANAGEMENT OF ADPKD:   The following are recommendations following an initial diagnosis of ADPKD:  • Renal ultrasound  • CT or MRI examination of abdomen  • Blood pressure examination  • Measurement of blood lipid concentrations  o Screens for hyperlipidemia that can be seen with renal disease  • Urine studies  o Screens for microalbuminuria or proteinuria which can be indicator of severe renal disease  • Echocardiography  o Recommended only in individuals with heart murmurs or systolic clicks   • Echocardiography or cardiac MRI**  o screens for thoracic aortic dissections (TAAD)  • Head MRA or CT angiography**  o Screens for intracranial aneurysms    **Only recommended in individuals who have a family history of these findings. Recommendations for someone who has a de nadeem likely pathogenic variant are variable and would be up to the discretion of the provider.      PLAN: This new report was discussed with Mr. Banks via telephone on 03/22/2022. A copy of the report is available for review under “Media.” Genetic counseling services remain available to Mr. Banks and he is welcome to  contact us with any questions or concerns at [phone].       Kayleen Trammell, MS, [facility]  Licensed Certified Genetic Counselor     Cc: Jasbir Lee

## 2022-03-24 ENCOUNTER — TRANSCRIBE ORDERS (OUTPATIENT)
Dept: ADMINISTRATIVE | Facility: HOSPITAL | Age: 52
End: 2022-03-24

## 2022-03-24 DIAGNOSIS — N28.1 RENAL CYST: Primary | ICD-10-CM

## 2022-08-01 ENCOUNTER — HOSPITAL ENCOUNTER (OUTPATIENT)
Dept: ULTRASOUND IMAGING | Facility: HOSPITAL | Age: 52
Discharge: HOME | End: 2022-08-01
Admitting: NURSE PRACTITIONER

## 2022-08-01 DIAGNOSIS — N28.1 RENAL CYST: ICD-10-CM

## 2022-08-01 PROCEDURE — 76775 US EXAM ABDO BACK WALL LIM: CPT

## 2022-08-08 ENCOUNTER — APPOINTMENT (OUTPATIENT)
Dept: ULTRASOUND IMAGING | Facility: HOSPITAL | Age: 52
End: 2022-08-08

## 2023-02-16 RX ORDER — VALSARTAN AND HYDROCHLOROTHIAZIDE 320; 25 MG/1; MG/1
TABLET, FILM COATED ORAL
Qty: 90 TABLET | Refills: 1 | Status: SHIPPED | OUTPATIENT
Start: 2023-02-16

## 2023-03-27 ENCOUNTER — TRANSCRIBE ORDERS (OUTPATIENT)
Dept: ADMINISTRATIVE | Facility: HOSPITAL | Age: 53
End: 2023-03-27
Payer: COMMERCIAL

## 2023-03-27 DIAGNOSIS — N28.1 SIMPLE RENAL CYST: Primary | ICD-10-CM

## 2023-05-07 RX ORDER — ROSUVASTATIN CALCIUM 10 MG/1
10 TABLET, COATED ORAL DAILY
Qty: 90 TABLET | Refills: 1 | OUTPATIENT
Start: 2023-05-07

## 2023-06-29 PROBLEM — Q61.3 POLYCYSTIC KIDNEY DISEASE: Status: ACTIVE | Noted: 2022-03-08

## 2023-06-29 PROBLEM — N28.1 RENAL CYST: Status: ACTIVE | Noted: 2022-03-08

## 2023-06-29 PROBLEM — I10 HYPERTENSION: Status: ACTIVE | Noted: 2022-03-08

## 2023-06-29 PROBLEM — E78.5 HYPERLIPIDEMIA: Status: ACTIVE | Noted: 2022-03-08

## 2023-06-29 PROBLEM — Z87.19 HISTORY OF DIVERTICULITIS: Status: ACTIVE | Noted: 2022-03-08

## 2023-06-29 PROBLEM — Z78.9 CURRENT DRINKER: Status: ACTIVE | Noted: 2022-03-08

## 2023-06-29 PROBLEM — E55.9 VITAMIN D DEFICIENCY: Status: ACTIVE | Noted: 2023-03-19

## 2023-06-29 PROBLEM — E83.52 HYPERCALCEMIA: Status: ACTIVE | Noted: 2022-03-08

## 2023-07-24 ENCOUNTER — HOSPITAL ENCOUNTER (OUTPATIENT)
Dept: CT IMAGING | Facility: HOSPITAL | Age: 53
Discharge: HOME | End: 2023-07-24
Payer: COMMERCIAL

## 2023-07-24 ENCOUNTER — TELEPHONE (OUTPATIENT)
Dept: GASTROENTEROLOGY | Facility: CLINIC | Age: 53
End: 2023-07-24

## 2023-07-24 ENCOUNTER — OFFICE VISIT (OUTPATIENT)
Dept: GASTROENTEROLOGY | Facility: CLINIC | Age: 53
End: 2023-07-24
Payer: COMMERCIAL

## 2023-07-24 ENCOUNTER — LAB (OUTPATIENT)
Dept: LAB | Facility: HOSPITAL | Age: 53
End: 2023-07-24
Payer: COMMERCIAL

## 2023-07-24 VITALS
OXYGEN SATURATION: 98 % | BODY MASS INDEX: 28.43 KG/M2 | TEMPERATURE: 97.3 F | HEIGHT: 70 IN | WEIGHT: 198.6 LBS | SYSTOLIC BLOOD PRESSURE: 118 MMHG | DIASTOLIC BLOOD PRESSURE: 76 MMHG | HEART RATE: 67 BPM

## 2023-07-24 DIAGNOSIS — R10.32 LEFT LOWER QUADRANT ABDOMINAL PAIN: ICD-10-CM

## 2023-07-24 DIAGNOSIS — K57.92 DIVERTICULITIS: ICD-10-CM

## 2023-07-24 DIAGNOSIS — R10.32 LEFT LOWER QUADRANT ABDOMINAL PAIN: Primary | ICD-10-CM

## 2023-07-24 DIAGNOSIS — K57.90 DIVERTICULOSIS: ICD-10-CM

## 2023-07-24 DIAGNOSIS — R79.89 ELEVATED LIVER FUNCTION TESTS: Primary | ICD-10-CM

## 2023-07-24 LAB
ALBUMIN SERPL-MCNC: 4.3 G/DL (ref 3.5–5.2)
ALBUMIN/GLOB SERPL: 1.5 G/DL
ALP SERPL-CCNC: 77 U/L (ref 39–117)
ALT SERPL W P-5'-P-CCNC: 33 U/L (ref 1–41)
ANION GAP SERPL CALCULATED.3IONS-SCNC: 9.8 MMOL/L (ref 5–15)
AST SERPL-CCNC: 35 U/L (ref 1–40)
BASOPHILS # BLD AUTO: 0.05 10*3/MM3 (ref 0–0.2)
BASOPHILS NFR BLD AUTO: 0.4 % (ref 0–1.5)
BILIRUB SERPL-MCNC: 1.3 MG/DL (ref 0–1.2)
BUN SERPL-MCNC: 15 MG/DL (ref 6–20)
BUN/CREAT SERPL: 15 (ref 7–25)
CALCIUM SPEC-SCNC: 9.7 MG/DL (ref 8.6–10.5)
CHLORIDE SERPL-SCNC: 100 MMOL/L (ref 98–107)
CO2 SERPL-SCNC: 29.2 MMOL/L (ref 22–29)
CREAT SERPL-MCNC: 1 MG/DL (ref 0.76–1.27)
DEPRECATED RDW RBC AUTO: 40 FL (ref 37–54)
EGFRCR SERPLBLD CKD-EPI 2021: 91.1 ML/MIN/1.73
EOSINOPHIL # BLD AUTO: 0.04 10*3/MM3 (ref 0–0.4)
EOSINOPHIL NFR BLD AUTO: 0.4 % (ref 0.3–6.2)
ERYTHROCYTE [DISTWIDTH] IN BLOOD BY AUTOMATED COUNT: 12.7 % (ref 12.3–15.4)
GLOBULIN UR ELPH-MCNC: 2.9 GM/DL
GLUCOSE SERPL-MCNC: 98 MG/DL (ref 65–99)
HCT VFR BLD AUTO: 44.8 % (ref 37.5–51)
HGB BLD-MCNC: 15.5 G/DL (ref 13–17.7)
IMM GRANULOCYTES # BLD AUTO: 0.04 10*3/MM3 (ref 0–0.05)
IMM GRANULOCYTES NFR BLD AUTO: 0.4 % (ref 0–0.5)
LYMPHOCYTES # BLD AUTO: 2.16 10*3/MM3 (ref 0.7–3.1)
LYMPHOCYTES NFR BLD AUTO: 19.1 % (ref 19.6–45.3)
MCH RBC QN AUTO: 30 PG (ref 26.6–33)
MCHC RBC AUTO-ENTMCNC: 34.6 G/DL (ref 31.5–35.7)
MCV RBC AUTO: 86.7 FL (ref 79–97)
MONOCYTES # BLD AUTO: 0.95 10*3/MM3 (ref 0.1–0.9)
MONOCYTES NFR BLD AUTO: 8.4 % (ref 5–12)
NEUTROPHILS NFR BLD AUTO: 71.3 % (ref 42.7–76)
NEUTROPHILS NFR BLD AUTO: 8.09 10*3/MM3 (ref 1.7–7)
NRBC BLD AUTO-RTO: 0 /100 WBC (ref 0–0.2)
PLATELET # BLD AUTO: 276 10*3/MM3 (ref 140–450)
PMV BLD AUTO: 9.5 FL (ref 6–12)
POTASSIUM SERPL-SCNC: 3.4 MMOL/L (ref 3.5–5.2)
PROT SERPL-MCNC: 7.2 G/DL (ref 6–8.5)
RBC # BLD AUTO: 5.17 10*6/MM3 (ref 4.14–5.8)
SODIUM SERPL-SCNC: 139 MMOL/L (ref 136–145)
WBC NRBC COR # BLD: 11.33 10*3/MM3 (ref 3.4–10.8)

## 2023-07-24 PROCEDURE — 0 DIATRIZOATE MEGLUMINE & SODIUM PER 1 ML

## 2023-07-24 PROCEDURE — 36415 COLL VENOUS BLD VENIPUNCTURE: CPT

## 2023-07-24 PROCEDURE — 85025 COMPLETE CBC W/AUTO DIFF WBC: CPT

## 2023-07-24 PROCEDURE — 99214 OFFICE O/P EST MOD 30 MIN: CPT

## 2023-07-24 PROCEDURE — 80053 COMPREHEN METABOLIC PANEL: CPT

## 2023-07-24 PROCEDURE — 74177 CT ABD & PELVIS W/CONTRAST: CPT

## 2023-07-24 PROCEDURE — 25510000001 IOPAMIDOL 61 % SOLUTION

## 2023-07-24 RX ORDER — DICYCLOMINE HYDROCHLORIDE 10 MG/1
10 CAPSULE ORAL 3 TIMES DAILY PRN
Qty: 90 CAPSULE | Refills: 1 | Status: SHIPPED | OUTPATIENT
Start: 2023-07-24

## 2023-07-24 RX ORDER — METRONIDAZOLE 500 MG/1
500 TABLET ORAL 3 TIMES DAILY
Qty: 30 TABLET | Refills: 0 | Status: SHIPPED | OUTPATIENT
Start: 2023-07-24

## 2023-07-24 RX ORDER — CIPROFLOXACIN 500 MG/1
500 TABLET, FILM COATED ORAL 2 TIMES DAILY
Qty: 20 TABLET | Refills: 0 | Status: SHIPPED | OUTPATIENT
Start: 2023-07-24 | End: 2023-08-03

## 2023-07-24 RX ADMIN — IOPAMIDOL 90 ML: 612 INJECTION, SOLUTION INTRAVENOUS at 14:31

## 2023-07-24 RX ADMIN — DIATRIZOATE MEGLUMINE AND DIATRIZOATE SODIUM 30 ML: 660; 100 LIQUID ORAL; RECTAL at 13:35

## 2023-07-24 NOTE — TELEPHONE ENCOUNTER
RN returned patient call. Pt has h/o diverticulitis. Reports pain in left, lower abdomen that stretches across abdomen. Pt has loose stools. Pt denies fever, n/v, and blood in stool. Pt is scheduled for OV tomorrow with practitioner. Pt is requesting to get started on antibiotics asap. Please advise. EL

## 2023-07-24 NOTE — PROGRESS NOTES
"Chief Complaint   Patient presents with    Abdominal Pain           History of Present Illness    Patient is a 52 y.o. who presents to the office for follow up evaluation.  Last in office visit was on 9/7/2021 with TERRI Bowman. Patient has a significant past medical history of diverticulosis and diverticulitis, adenomatous colon polyps, nonbleeding internal hemorrhoids, liver cyst, and polycystic kidney disease.       Most recent colonoscopy evaluation performed on 12/15/2020 with Dr. Santana revealed:  - Multiple small mouth diverticula in sigmoid and descending colon  - Patchy area of mildly granular mucosa in sigmoid colon questionable for resolving diverticulitis  - 2 mm tubular adenomatous polyp in the cecum  - 4 mm polyp in the ascending colon  - Nonbleeding internal hemorrhoids    Dr. Santana recommended starting daily probiotic daily fibercon    Recommended next colonoscopy in 5 years for colon cancer screening secondary to presence of precancerous polyp due December 2025.    Patient presents the office today for further evaluation of worsening left lower quadrant abdominal pain similar to pain experienced during episode of diverticulitis in 2021.    He denies upper GI symptoms including heartburn, nausea, vomiting, or dysphagia.  No fever or chills.    Reports that cramping abdominal pain began on Thursday and localized to left lower quadrant abdomen that has progressively worsened involving the lower abdomen.      Bowel habits are described as occurring daily however are now a loose consistency with decreased volume without visible melena, hematochezia, or mucus.    Patient denies known family history of colon polyps, colon cancer, or IBD.       Result Review :         Office Visit with Nicole Lindsay APRN (09/07/2021)   SCANNED - COLONOSCOPY (12/15/2020)   Tissue Pathology Exam (12/15/2020 11:48)     Vital Signs:   /76   Pulse 67   Temp 97.3 °F (36.3 °C)   Ht 177.8 cm (70\")   Wt 90.1 " kg (198 lb 9.6 oz)   SpO2 98%   BMI 28.50 kg/m²     Body mass index is 28.5 kg/m².     Physical Exam  Vitals reviewed.   Constitutional:       General: He is not in acute distress.     Appearance: Normal appearance. He is not ill-appearing.   Eyes:      General: No scleral icterus.  Pulmonary:      Effort: Pulmonary effort is normal. No respiratory distress.   Abdominal:      General: Bowel sounds are normal. There is no distension. There are no signs of injury.      Palpations: Abdomen is soft. Abdomen is not rigid. There is no mass or pulsatile mass.      Tenderness: There is abdominal tenderness in the right lower quadrant and left lower quadrant. There is no guarding or rebound. Negative signs include Yates's sign and McBurney's sign.      Hernia: No hernia is present.      Comments: Left lower quadrant/lower abdominal tenderness with palpation.   Skin:     Coloration: Skin is not jaundiced.   Neurological:      Mental Status: He is alert and oriented to person, place, and time.   Psychiatric:         Thought Content: Thought content normal.         Judgment: Judgment normal.         Assessment and Plan    Diagnoses and all orders for this visit:    1. Left lower quadrant abdominal pain (Primary)  -     Comprehensive Metabolic Panel  -     CBC & Differential  -     Cancel: CT Abdomen Pelvis With Contrast; Future  -     dicyclomine (BENTYL) 10 MG capsule; Take 1 capsule by mouth 3 (Three) Times a Day As Needed (abdominal pain/diarrhea). Take one tablet up to 3 times a day as needed for abdominal pain or diarrhea.  Dispense: 90 capsule; Refill: 1  -     CT Abdomen Pelvis With Contrast; Future    2. Diverticulosis  -     Cancel: CT Abdomen Pelvis With Contrast; Future  -     dicyclomine (BENTYL) 10 MG capsule; Take 1 capsule by mouth 3 (Three) Times a Day As Needed (abdominal pain/diarrhea). Take one tablet up to 3 times a day as needed for abdominal pain or diarrhea.  Dispense: 90 capsule; Refill: 1  -     CT  Abdomen Pelvis With Contrast; Future           Discussion:    Patient presents today for further evaluation of left lower quadrant abdominal pain.  Discussed recommendations to proceed with stat CT imaging of the abdomen pelvis to assess for diverticulitis, appendicitis, or alternative acute abdominal process contributing to pain.    We will send in a prescription for dicyclomine 10 mg to be taken up to 4 times daily as needed for abdominal cramping.  Discussed possible side effects that include dry eyes, dry mouth, and constipation.  Written instructions provided to patient on low residue diet that he can resume after completion of stat abdominal CT imaging.    Notified that if acute diverticulitis noted on CT imaging will plan to treat with Flagyl/Cipro 10-day regimen.  He is agreeable with this plan.  We will also plan for follow-up colonoscopy in 8 weeks if acute flare is noted.    Patient is agreeable to the outlined above treatment plan.  Verbalizes understanding and will contact office for any new or worsening concerns.  All questions answered and support provided.    Addendum:    Notified patient via telephone of acute uncomplicated sigmoid diverticulitis findings on CT imaging as well as leukocytosis noted on blood work we will proceed with prescription for Cipro/Flagyl to be taken for 10 days.  He will provide a symptom update after completion of antibiotics as well as to discuss timing of future colonoscopy.  Notified that would recommend no sooner than September 24,2023.    He is agreeable with this plan and will contact her office for any new or worsening GI concerns.  Patient Instructions   Blood work today     Once we receive these results, our office will contact you to discuss updating your treatment plan as indicated      CT imaging today to further assess lower abdominal pain      For abdominal cramping, fecal urgency, and diarrhea:   a prescription for dicyclomine has been sent to your pharmacy.   You may take 1 tablet up to 4 times daily as needed for symptom management.  This medication does work well if taken 1 hour before meals to help prevent the fecal urgency that can occur after eating.      Low Residue Diet:    A low residue diet will decrease the amount and slow the movement of stool in the intestines. This may prevent blockage.     Many people think that a low residue diet and a low fiber diet are the same thing. Although they are similar, they are not exactly the same. Fiber is that part of plant foods that is not completely digested in the colon and contributes to stool. Residue includes that undigested fiber and any other food materials remaining in the colon after digestion that may increase stool output. A low fiber diet contains less than 10 grams of fiber per day and limits those foods known to increase the amount of stool. However, some low-fiber foods, such as milk, can actually increase residue or stimulate bowel movement. For that reason, a low residue diet has more restrictions than a low fiber diet.     When appropriate food choices are made, a low residue diet will provide the Recommended Dietary Allowances (RDAs). However, long-term use of a low residue diet may not provide the needed amounts of vitamin C or folic acid. If you must stay on this diet for a long period, a multivitamin or mineral supplement may be necessary. Check with your doctor or dietician for their recommendations.     This diet gives you a good variety of foods so it should not become tiresome. High-fiber or high-roughage foods are not used. Foods with some fiber in them (fruits and vegetables) should be well cooked. Milk does not have any fiber that you can see. Milk, however, does leave some residue in your colon after it is digested. This is why milk is limited to two cups a day.                  EMR Dragon/Transcription Disclaimer:  This document has been Dictated utilizing Dragon dictation.

## 2023-07-24 NOTE — PATIENT INSTRUCTIONS
Blood work today     Once we receive these results, our office will contact you to discuss updating your treatment plan as indicated      CT imaging today to further assess lower abdominal pain      For abdominal cramping, fecal urgency, and diarrhea:   a prescription for dicyclomine has been sent to your pharmacy.  You may take 1 tablet up to 4 times daily as needed for symptom management.  This medication does work well if taken 1 hour before meals to help prevent the fecal urgency that can occur after eating.      Low Residue Diet:    A low residue diet will decrease the amount and slow the movement of stool in the intestines. This may prevent blockage.     Many people think that a low residue diet and a low fiber diet are the same thing. Although they are similar, they are not exactly the same. Fiber is that part of plant foods that is not completely digested in the colon and contributes to stool. Residue includes that undigested fiber and any other food materials remaining in the colon after digestion that may increase stool output. A low fiber diet contains less than 10 grams of fiber per day and limits those foods known to increase the amount of stool. However, some low-fiber foods, such as milk, can actually increase residue or stimulate bowel movement. For that reason, a low residue diet has more restrictions than a low fiber diet.     When appropriate food choices are made, a low residue diet will provide the Recommended Dietary Allowances (RDAs). However, long-term use of a low residue diet may not provide the needed amounts of vitamin C or folic acid. If you must stay on this diet for a long period, a multivitamin or mineral supplement may be necessary. Check with your doctor or dietician for their recommendations.     This diet gives you a good variety of foods so it should not become tiresome. High-fiber or high-roughage foods are not used. Foods with some fiber in them (fruits and vegetables) should  be well cooked. Milk does not have any fiber that you can see. Milk, however, does leave some residue in your colon after it is digested. This is why milk is limited to two cups a day.

## 2023-07-24 NOTE — TELEPHONE ENCOUNTER
Hub staff attempted to follow warm transfer process and was unsuccessful     Caller: Jasbir Banks    Relationship to patient: Self    Best call back number: [phone]    Patient is needing: PATIENT CALLING TO REQUEST A CALLBACK FROM SOMEONE IN CLINICAL.  DID NOT SPECIFY.  PLEASE REACH OUT.

## 2023-07-24 NOTE — TELEPHONE ENCOUNTER
RN called and discussed earlier appointment with patient. Patient will be in office for appointment at 1230 pm this afternoon. Per provider patient will be NPO until after office visit, for diagnostic imaging. Patient has hussein notified. EL

## 2023-09-08 ENCOUNTER — HOSPITAL ENCOUNTER (OUTPATIENT)
Dept: ULTRASOUND IMAGING | Facility: HOSPITAL | Age: 53
Discharge: HOME | End: 2023-09-08
Payer: COMMERCIAL

## 2023-09-08 DIAGNOSIS — N28.1 SIMPLE RENAL CYST: ICD-10-CM

## 2023-09-08 PROCEDURE — 76775 US EXAM ABDO BACK WALL LIM: CPT

## 2023-10-02 ENCOUNTER — PREP FOR SURGERY (OUTPATIENT)
Dept: SURGERY | Facility: SURGERY CENTER | Age: 53
End: 2023-10-02
Payer: COMMERCIAL

## 2023-10-02 DIAGNOSIS — Z12.11 ENCOUNTER FOR SCREENING FOR MALIGNANT NEOPLASM OF COLON: Primary | ICD-10-CM

## 2023-10-02 DIAGNOSIS — K57.92 DIVERTICULITIS: ICD-10-CM

## 2023-10-02 DIAGNOSIS — K57.90 DIVERTICULOSIS: ICD-10-CM

## 2023-10-02 RX ORDER — SODIUM CHLORIDE, SODIUM LACTATE, POTASSIUM CHLORIDE, CALCIUM CHLORIDE 600; 310; 30; 20 MG/100ML; MG/100ML; MG/100ML; MG/100ML
30 INJECTION, SOLUTION INTRAVENOUS CONTINUOUS PRN
OUTPATIENT
Start: 2023-10-02

## 2023-10-02 RX ORDER — SODIUM CHLORIDE 0.9 % (FLUSH) 0.9 %
10 SYRINGE (ML) INJECTION AS NEEDED
OUTPATIENT
Start: 2023-10-02

## 2023-10-02 RX ORDER — SODIUM CHLORIDE 0.9 % (FLUSH) 0.9 %
3 SYRINGE (ML) INJECTION EVERY 12 HOURS SCHEDULED
OUTPATIENT
Start: 2023-10-02

## 2023-10-03 ENCOUNTER — TELEPHONE (OUTPATIENT)
Dept: GASTROENTEROLOGY | Facility: CLINIC | Age: 53
End: 2023-10-03
Payer: COMMERCIAL

## 2023-10-03 NOTE — TELEPHONE ENCOUNTER
RN called and left voicemail for patient regarding provider's note. Kerry Pruitt APRN Lovett, Emily, RN  Please contact patient and see how he is doing following his diverticulitis episode.  If he has not experienced any recurrence in symptoms would recommend proceeding with colonoscopy at this time for colon cancer screening after acute diverticulitis episode.    I have placed a case request to help facilitate scheduling of procedure.  If patient is agreeable at this time to proceed can you please notify surgery schedulers so that they may contact patient to schedule procedure.    Thank you,    TERRI Seals

## 2023-10-17 ENCOUNTER — PREP FOR SURGERY (OUTPATIENT)
Dept: SURGERY | Facility: SURGERY CENTER | Age: 53
End: 2023-10-17
Payer: COMMERCIAL

## 2023-10-17 ENCOUNTER — OFFICE VISIT (OUTPATIENT)
Dept: GASTROENTEROLOGY | Facility: CLINIC | Age: 53
End: 2023-10-17
Payer: COMMERCIAL

## 2023-10-17 VITALS
HEART RATE: 77 BPM | DIASTOLIC BLOOD PRESSURE: 78 MMHG | SYSTOLIC BLOOD PRESSURE: 142 MMHG | BODY MASS INDEX: 28.86 KG/M2 | WEIGHT: 201.6 LBS | HEIGHT: 70 IN | TEMPERATURE: 98.1 F | OXYGEN SATURATION: 97 %

## 2023-10-17 DIAGNOSIS — K57.92 DIVERTICULITIS: Chronic | ICD-10-CM

## 2023-10-17 DIAGNOSIS — R10.32 LEFT LOWER QUADRANT ABDOMINAL PAIN: Primary | ICD-10-CM

## 2023-10-17 DIAGNOSIS — K63.5 COLON POLYPS: ICD-10-CM

## 2023-10-17 DIAGNOSIS — Z87.19 HISTORY OF DIVERTICULITIS: ICD-10-CM

## 2023-10-17 DIAGNOSIS — K57.90 DIVERTICULOSIS: Chronic | ICD-10-CM

## 2023-10-17 DIAGNOSIS — K57.90 DIVERTICULOSIS: ICD-10-CM

## 2023-10-17 DIAGNOSIS — R10.32 LEFT LOWER QUADRANT ABDOMINAL PAIN: Primary | Chronic | ICD-10-CM

## 2023-10-17 PROCEDURE — 99214 OFFICE O/P EST MOD 30 MIN: CPT | Performed by: NURSE PRACTITIONER

## 2023-10-17 RX ORDER — SODIUM CHLORIDE 0.9 % (FLUSH) 0.9 %
10 SYRINGE (ML) INJECTION AS NEEDED
OUTPATIENT
Start: 2023-10-17

## 2023-10-17 RX ORDER — SODIUM CHLORIDE, SODIUM LACTATE, POTASSIUM CHLORIDE, CALCIUM CHLORIDE 600; 310; 30; 20 MG/100ML; MG/100ML; MG/100ML; MG/100ML
30 INJECTION, SOLUTION INTRAVENOUS CONTINUOUS PRN
OUTPATIENT
Start: 2023-10-17

## 2023-10-17 RX ORDER — AMOXICILLIN AND CLAVULANATE POTASSIUM 875; 125 MG/1; MG/1
1 TABLET, FILM COATED ORAL 2 TIMES DAILY
Qty: 20 TABLET | Refills: 0 | Status: SHIPPED | OUTPATIENT
Start: 2023-10-17

## 2023-10-17 RX ORDER — SODIUM CHLORIDE 0.9 % (FLUSH) 0.9 %
3 SYRINGE (ML) INJECTION EVERY 12 HOURS SCHEDULED
OUTPATIENT
Start: 2023-10-17

## 2023-10-17 NOTE — PROGRESS NOTES
"Chief Complaint   Patient presents with    Follow-up     Diverticulitis, left lower quadrant abdominal pain         History of Present Illness  52-year-old male presents the office today for evaluation for possible diverticulitis.  He was last seen in office on 7/24/2023 with left lower quadrant abdominal pain and underwent CT scan of the abdomen and pelvis revealing acute uncomplicated sigmoid diverticulitis.  Patient was placed on a course of antibiotic therapy and was also given a prescription for dicyclomine. This is his 4th episode of diverticulitis.     He has not taken any dicyclomine. He reports LLQ abdominal pain that extends across to the right side of his colon. The pain feels like it has before with diverticulitis. He denies fever. He has not had a major change in bowel habits and denies any rectal bleeding. He reports that he is watching his diet.     Last colonoscopy was performed on 12/15/2020 revealing diverticulosis, an area in the sigmoid colon questionable for resolving diverticulitis, nonbleeding internal and colon polyps, a total of 2 colon polyps, 1 of which was adenomatous.  Patient was recommended undergo next colonoscopy December 2025.    Result Review :       Office Visit with Kerry Muller APRN (07/24/2023)   SCANNED - COLONOSCOPY (12/15/2020)   Tissue Pathology Exam (12/15/2020 11:48)   CT Abdomen Pelvis With Contrast (07/24/2023 14:39)     Vital Signs:   /78   Pulse 77   Temp 98.1 °F (36.7 °C)   Ht 177.8 cm (70\")   Wt 91.4 kg (201 lb 9.6 oz)   SpO2 97%   BMI 28.93 kg/m²     Body mass index is 28.93 kg/m².     Physical Exam  Vitals reviewed.   Constitutional:       Appearance: Normal appearance.   Pulmonary:      Effort: Pulmonary effort is normal. No respiratory distress.   Abdominal:      General: Abdomen is flat. Bowel sounds are normal. There is no distension.      Palpations: Abdomen is soft. There is no mass.      Tenderness: There is abdominal tenderness. There is no " guarding.   Musculoskeletal:         General: Normal range of motion.   Skin:     General: Skin is warm and dry.   Neurological:      General: No focal deficit present.      Mental Status: He is alert and oriented to person, place, and time.   Psychiatric:         Mood and Affect: Mood normal.         Behavior: Behavior normal.         Thought Content: Thought content normal.         Judgment: Judgment normal.       Assessment and Plan    Diagnoses and all orders for this visit:    1. Left lower quadrant abdominal pain (Primary)  Comments:  Recurrent    2. Diverticulitis  Comments:  Recurrent    3. Diverticulosis    4. History of diverticulitis    Other orders  -     amoxicillin-clavulanate (AUGMENTIN) 875-125 MG per tablet; Take 1 tablet by mouth 2 (Two) Times a Day.  Dispense: 20 tablet; Refill: 0           Patient Instructions    For suspected recurrent diverticulitis we will place you on course of Augmentin 875/125 mg. You will take 1 tab twice daily x 10 days.     2.   We will plan to schedule a colonoscopy for further evaluation at least 8 weeks from now for evaluation of your recurrent diverticulitis and as well to assess for any colon polyps due to your history of polyps.     3.   Recommend office follow up 4 weeks after procedure to discuss results and reassess symptoms-or sooner should symptoms worsen or fail to improve.     4.  For diverticulitis and history of diverticulitis, once you have completed our antibiotic therapy we recommend that you start a daily fiber supplement such as Benefiber, Citrucel, or Metamucil. Generic version is also fine. We recommend starting off with one serving per day and you may adjust based upon how your bowel habits play out.       Discussion:    It appears that the patient has recurrent diverticulitis in the left lower quadrant.  We will place him on a course of Augmentin.  We will also plan to schedule a colonoscopy at least 8 weeks out for further evaluation as he has  had a total of 4 episodes of diverticulitis.  Patient was recommended follow-up in office 4 weeks after procedure to discuss results and reassess symptoms or sooner should his symptoms recur.  High-fiber diet was recommended once patient completes course of Augmentin.  Patient verbalized understanding above plan of care and is in agreement.  All questions answered and support provided.    EMR Dragon/Transcription Disclaimer:  This document has been Dictated utilizing Dragon dictation.

## 2023-10-17 NOTE — PATIENT INSTRUCTIONS
For suspected recurrent diverticulitis we will place you on course of Augmentin 875/125 mg. You will take 1 tab twice daily x 10 days.     2.   We will plan to schedule a colonoscopy for further evaluation at least 8 weeks from now for evaluation of your recurrent diverticulitis and as well to assess for any colon polyps due to your history of polyps.     3.   Recommend office follow up 4 weeks after procedure to discuss results and reassess symptoms-or sooner should symptoms worsen or fail to improve.     4.  For diverticulitis and history of diverticulitis, once you have completed our antibiotic therapy we recommend that you start a daily fiber supplement such as Benefiber, Citrucel, or Metamucil. Generic version is also fine. We recommend starting off with one serving per day and you may adjust based upon how your bowel habits play out.

## 2023-10-31 ENCOUNTER — TELEPHONE (OUTPATIENT)
Dept: GASTROENTEROLOGY | Facility: CLINIC | Age: 53
End: 2023-10-31
Payer: COMMERCIAL

## 2023-10-31 NOTE — TELEPHONE ENCOUNTER
Patient returned call to reschedule scope on 12/27.  He was given several dates and said 12/19 would be fine

## 2023-12-19 ENCOUNTER — HOSPITAL ENCOUNTER (OUTPATIENT)
Facility: SURGERY CENTER | Age: 53
Setting detail: HOSPITAL OUTPATIENT SURGERY
Discharge: HOME | End: 2023-12-19
Attending: INTERNAL MEDICINE | Admitting: INTERNAL MEDICINE
Payer: COMMERCIAL

## 2023-12-19 ENCOUNTER — ANESTHESIA (OUTPATIENT)
Dept: SURGERY | Facility: SURGERY CENTER | Age: 53
End: 2023-12-19
Payer: COMMERCIAL

## 2023-12-19 ENCOUNTER — ANESTHESIA EVENT (OUTPATIENT)
Dept: SURGERY | Facility: SURGERY CENTER | Age: 53
End: 2023-12-19
Payer: COMMERCIAL

## 2023-12-19 VITALS
DIASTOLIC BLOOD PRESSURE: 83 MMHG | HEART RATE: 67 BPM | OXYGEN SATURATION: 94 % | WEIGHT: 195 LBS | TEMPERATURE: 98.2 F | SYSTOLIC BLOOD PRESSURE: 121 MMHG | RESPIRATION RATE: 20 BRPM | HEIGHT: 70 IN | BODY MASS INDEX: 27.92 KG/M2

## 2023-12-19 DIAGNOSIS — Z87.19 HISTORY OF DIVERTICULITIS: ICD-10-CM

## 2023-12-19 DIAGNOSIS — K57.90 DIVERTICULOSIS: ICD-10-CM

## 2023-12-19 DIAGNOSIS — K63.5 COLON POLYPS: ICD-10-CM

## 2023-12-19 DIAGNOSIS — R10.32 LEFT LOWER QUADRANT ABDOMINAL PAIN: ICD-10-CM

## 2023-12-19 PROCEDURE — 25010000002 LIDOCAINE 1 % SOLUTION: Performed by: ANESTHESIOLOGY

## 2023-12-19 PROCEDURE — 25810000003 LACTATED RINGERS PER 1000 ML: Performed by: NURSE PRACTITIONER

## 2023-12-19 PROCEDURE — 45385 COLONOSCOPY W/LESION REMOVAL: CPT | Performed by: INTERNAL MEDICINE

## 2023-12-19 PROCEDURE — 88305 TISSUE EXAM BY PATHOLOGIST: CPT | Performed by: INTERNAL MEDICINE

## 2023-12-19 PROCEDURE — 45380 COLONOSCOPY AND BIOPSY: CPT | Performed by: INTERNAL MEDICINE

## 2023-12-19 PROCEDURE — 25010000002 PROPOFOL 10 MG/ML EMULSION: Performed by: ANESTHESIOLOGY

## 2023-12-19 RX ORDER — SODIUM CHLORIDE 0.9 % (FLUSH) 0.9 %
3 SYRINGE (ML) INJECTION EVERY 12 HOURS SCHEDULED
Status: DISCONTINUED | OUTPATIENT
Start: 2023-12-19 | End: 2023-12-19 | Stop reason: HOSPADM

## 2023-12-19 RX ORDER — MAGNESIUM HYDROXIDE 1200 MG/15ML
LIQUID ORAL AS NEEDED
Status: DISCONTINUED | OUTPATIENT
Start: 2023-12-19 | End: 2023-12-19 | Stop reason: HOSPADM

## 2023-12-19 RX ORDER — LIDOCAINE HYDROCHLORIDE 10 MG/ML
INJECTION, SOLUTION INFILTRATION; PERINEURAL AS NEEDED
Status: DISCONTINUED | OUTPATIENT
Start: 2023-12-19 | End: 2023-12-19 | Stop reason: SURG

## 2023-12-19 RX ORDER — SODIUM CHLORIDE 0.9 % (FLUSH) 0.9 %
10 SYRINGE (ML) INJECTION AS NEEDED
Status: DISCONTINUED | OUTPATIENT
Start: 2023-12-19 | End: 2023-12-19 | Stop reason: HOSPADM

## 2023-12-19 RX ORDER — ROSUVASTATIN CALCIUM 10 MG/1
10 TABLET, COATED ORAL DAILY
Qty: 90 TABLET | Refills: 0 | Status: SHIPPED | OUTPATIENT
Start: 2023-12-19

## 2023-12-19 RX ORDER — PROPOFOL 10 MG/ML
VIAL (ML) INTRAVENOUS AS NEEDED
Status: DISCONTINUED | OUTPATIENT
Start: 2023-12-19 | End: 2023-12-19 | Stop reason: SURG

## 2023-12-19 RX ORDER — SODIUM CHLORIDE, SODIUM LACTATE, POTASSIUM CHLORIDE, CALCIUM CHLORIDE 600; 310; 30; 20 MG/100ML; MG/100ML; MG/100ML; MG/100ML
30 INJECTION, SOLUTION INTRAVENOUS CONTINUOUS PRN
Status: DISCONTINUED | OUTPATIENT
Start: 2023-12-19 | End: 2023-12-19 | Stop reason: HOSPADM

## 2023-12-19 RX ADMIN — PROPOFOL 100 MG: 10 INJECTION, EMULSION INTRAVENOUS at 11:08

## 2023-12-19 RX ADMIN — PROPOFOL 140 MCG/KG/MIN: 10 INJECTION, EMULSION INTRAVENOUS at 11:08

## 2023-12-19 RX ADMIN — SODIUM CHLORIDE, POTASSIUM CHLORIDE, SODIUM LACTATE AND CALCIUM CHLORIDE 30 ML/HR: 600; 310; 30; 20 INJECTION, SOLUTION INTRAVENOUS at 10:00

## 2023-12-19 RX ADMIN — LIDOCAINE HYDROCHLORIDE 30 MG: 10 INJECTION, SOLUTION INFILTRATION; PERINEURAL at 11:08

## 2023-12-19 NOTE — ANESTHESIA PREPROCEDURE EVALUATION
Anesthesia Evaluation     Patient summary reviewed and Nursing notes reviewed                Airway   Mallampati: II  TM distance: >3 FB  Neck ROM: full  No difficulty expected  Dental - normal exam     Pulmonary - negative pulmonary ROS and normal exam   Cardiovascular - normal exam  Exercise tolerance: good (4-7 METS)    ECG reviewed    (+) hypertension, hyperlipidemia      Neuro/Psych- negative ROS  GI/Hepatic/Renal/Endo - negative ROS     Musculoskeletal (-) negative ROS    Abdominal    Substance History - negative use     OB/GYN          Other                    Anesthesia Plan    ASA 2     MAC     intravenous induction     Anesthetic plan, risks, benefits, and alternatives have been provided, discussed and informed consent has been obtained with: patient.    CODE STATUS:

## 2023-12-19 NOTE — H&P
No chief complaint on file.      HPI  Llq pain  H/o polyps  H/o diverticulitis         Problem List:    Patient Active Problem List   Diagnosis    Current drinker    History of diverticulitis    Hypercalcemia    Hyperlipidemia    Primary hypertension    Polycystic kidney disease    Vitamin D deficiency    Left lower quadrant abdominal pain    Diverticulosis    Diverticulitis    Encounter for screening for malignant neoplasm of colon    Colon polyps       Medical History:    Past Medical History:   Diagnosis Date    Abdominal cramping     Chronic kidney disease     High blood pressure     High cholesterol     Hypercholesterolemia     Hyperglycemia 04/26/2021    Hyperlipidemia     Hypertension     Intermittent left lower quadrant abdominal pain     Loose stools     Multiple renal cysts 04/26/2021        Social History:    Social History     Socioeconomic History    Marital status:     Number of children: 2   Tobacco Use    Smoking status: Never    Smokeless tobacco: Never   Vaping Use    Vaping Use: Never used   Substance and Sexual Activity    Alcohol use: Yes     Comment: very rarely    Drug use: No    Sexual activity: Defer       Family History:   Family History   Problem Relation Age of Onset    Prostate cancer Father     Hyperlipidemia Father     Diverticulitis Other     Asthma Other     Osteoporosis Other     Stroke Other     Hyperlipidemia Other     Hypertension Other     Irritable bowel syndrome Mother     Colon cancer Neg Hx     Colon polyps Neg Hx     Crohn's disease Neg Hx     Ulcerative colitis Neg Hx        Surgical History:   Past Surgical History:   Procedure Laterality Date    COLONOSCOPY      [place]- 12/2020    INGUINAL HERNIA REPAIR      TONSILLECTOMY         No current facility-administered medications for this encounter.    Current Outpatient Medications:     amoxicillin-clavulanate (AUGMENTIN) 875-125 MG per tablet, Take 1 tablet by mouth 2 (Two) Times a Day., Disp: 20 tablet, Rfl: 0     dicyclomine (BENTYL) 10 MG capsule, Take 1 capsule by mouth 3 (Three) Times a Day As Needed (abdominal pain/diarrhea). Take one tablet up to 3 times a day as needed for abdominal pain or diarrhea., Disp: 90 capsule, Rfl: 1    Multiple Vitamins-Minerals (MULTIVITAMIN ADULT PO), Take  by mouth., Disp: , Rfl:     rosuvastatin (CRESTOR) 10 MG tablet, Take 1 tablet by mouth Daily., Disp: 90 tablet, Rfl: 0    valsartan-hydrochlorothiazide (DIOVAN-HCT) 320-25 MG per tablet, Take 1 tablet by mouth Daily., Disp: 90 tablet, Rfl: 1    vitamin C (ASCORBIC ACID) 500 MG tablet, Take 1 tablet by mouth Daily., Disp: , Rfl:     Allergies: No Known Allergies     The following portions of the patient's history were reviewed by me and updated as appropriate: review of systems, allergies, current medications, past family history, past medical history, past social history, past surgical history and problem list.    There were no vitals filed for this visit.    PHYSICAL EXAM:    CONSTITUTIONAL:  today's vital signs reviewed by me  GASTROINTESTINAL: abdomen is soft nontender nondistended with normal active bowel sounds, no masses are appreciated    Assessment/ Plan  Llq pain  H/o polyps  H/o diverticulitis    colonoscopy    Risks and benefits as well as alternatives to endoscopic evaluation were explained to the patient and they voiced understanding and wish to proceed.  These risks include but are not limited to the risk of bleeding, perforation, adverse reaction to sedation, and missed lesions.  The patient was given the opportunity to ask questions prior to the endoscopic procedure.

## 2023-12-19 NOTE — ANESTHESIA POSTPROCEDURE EVALUATION
"Patient: Jasbir Banks    Procedure Summary       Date: 12/19/23 Room / Location: [facility] / [facility]    Anesthesia Start: 1103 Anesthesia Stop: 1131    Procedure: COLONOSCOPY to cecum with polypectomy and biopsy Diagnosis:       Left lower quadrant abdominal pain      History of diverticulitis      Diverticulosis      Colon polyps      (Left lower quadrant abdominal pain [R10.32])      (History of diverticulitis [Z87.19])      (Diverticulosis [K57.90])      (Colon polyps [K63.5])    Surgeons: Jv Santana MD Provider: Doron Patel MD    Anesthesia Type: MAC ASA Status: 2            Anesthesia Type: MAC    Vitals  Vitals Value Taken Time   /83 12/19/23 1136   Temp 36.8 °C (98.2 °F) 12/19/23 1130   Pulse 67 12/19/23 1136   Resp 16 12/19/23 1130   SpO2 94 % 12/19/23 1136   Vitals shown include unfiled device data.        Post Anesthesia Care and Evaluation    Patient location during evaluation: PACU  Patient participation: complete - patient participated  Level of consciousness: awake and alert  Pain management: adequate    Airway patency: patent  Anesthetic complications: No anesthetic complications  PONV Status: controlled  Cardiovascular status: acceptable  Respiratory status: acceptable  Hydration status: acceptable    Comments: /83 (BP Location: Left arm, Patient Position: Lying)   Pulse 68   Temp 36.8 °C (98.2 °F) (Infrared)   Resp 16   Ht 177.8 cm (70\")   Wt 88.5 kg (195 lb)   SpO2 94%   BMI 27.98 kg/m²       "

## 2023-12-20 LAB
LAB AP CASE REPORT: NORMAL
LAB AP CLINICAL INFORMATION: NORMAL
PATH REPORT.FINAL DX SPEC: NORMAL
PATH REPORT.GROSS SPEC: NORMAL

## 2023-12-28 DIAGNOSIS — Z87.19 HISTORY OF DIVERTICULITIS: Primary | ICD-10-CM

## 2024-01-18 ENCOUNTER — OFFICE VISIT (OUTPATIENT)
Dept: SURGERY | Facility: CLINIC | Age: 54
End: 2024-01-18
Payer: COMMERCIAL

## 2024-01-18 VITALS
SYSTOLIC BLOOD PRESSURE: 125 MMHG | HEIGHT: 70 IN | DIASTOLIC BLOOD PRESSURE: 82 MMHG | BODY MASS INDEX: 28.26 KG/M2 | WEIGHT: 197.4 LBS

## 2024-01-18 DIAGNOSIS — K57.92 DIVERTICULITIS: Primary | ICD-10-CM

## 2024-01-21 NOTE — PROGRESS NOTES
ASSESSMENT/PLAN:    53 year old male with chronic, recurrent diverticulitis. Dr. Delatorre and I discussed with him the nature of diverticulitis. Discussed the option to proceed with observation/medical management and the benefits and risks. Discussed the option to proceed with surgery. Reviewed surgery would entail a laparoscopic sigmoid or low anterior resection. Discussed the nature of the procedure, and the benefits and risks, including but not limited to bleeding, infection, conversion to an open procedure, and an anastomotic leak requiring re-operation and a temporary ostomy. Reviewed the need for a mechanical bowel prep and oral antibiotic preparation. He would like to continue with observation at this time. He knows to call the office should he decide to proceed with surgery.     CC:     Diverticulitis     HPI:    53-year-old male who presents today for evaluation of recurrent diverticulitis. He states his first episode was in October 2020.  He had a CT scan at that time that confirmed diverticulitis.  He had left lower quadrant pain and loose stools for about a week.  Denies any fevers or chills.  He was treated with antibiotics.  He reports an additional episode a year or so after the 2020 episode.  In July 2023 he had another episode that was confirmed on CT.  He states his most recent episode was in October 2023.  Again, all episodes have resolved with outpatient antibiotics.  He denies ever being hospitalized for diverticulitis.  He is up-to-date on his colonoscopy.  Denies any family history of colorectal cancer.  Past abdominal surgical history significant for an open right inguinal hernia pair as a child and a laparoscopic left inguinal hernia pair over 10 years ago.    ENDOSCOPY:   Colonoscopy 12/19/2023 Dr. Santana: Diverticulosis in the sigmoid and descending colon, cecal polyp (pathology: sessile serrated lesion), granular mucosa in the sigmoid colon (pathology: benign), nonbleeding internal  "hemorrhoids    RADIOLOGY:   CT Abdomen/Pelvis 10/7/2020: Acute diverticulitis proximal sigmoid colon, no extraluminal air or fluid collection, bilateral kidney cysts, hepatic cysts  CT Abdomen/Pelvis 7/24/2023: Acute uncomplicated sigmoid diverticulitis, trace amount of free fluid, no fluid collection/abscess    LABS:    7/24/2023 WBC 11.33, potassium 3.4, total bilirubin 1.3    SOCIAL HISTORY:   Denies tobacco use  Occasional alcohol use    FAMILY HISTORY:    Colorectal cancer: negative    PREVIOUS ABDOMINAL SURGERY:  Open right inguinal hernia repair (as a child, 1974)  Laparoscopic left inguinal hernia repair    OTHER SURGERY:  Tonsillectomy    PAST MEDICAL HISTORY:    Hyperlipidemia  Hypertension  Vitamin D deficiency  Diverticulitis  Polycystic kidney disease    ALLERGIES:   None    MEDICATIONS:   Dicyclomine  Multivitamin  Rosuvastatin  Valsartan-hydrochlorothiazide  Vitamin C    ROS:    No cough, chest pain, shortness of air.  All other systems reviewed and negative other than presenting complaints.    PHYSICAL EXAM:   Constitutional: Well-developed, well-nourished, no acute distress  Vital signs:   Ht: 177.8cm (70\")  Wt: 89.5kg (197lb)  BMI: 8.32  Eyes: Conjunctiva normal, sclera nonicteric  ENMT: Hearing grossly normal, oral mucosa moist  Respiratory: Normal inspiratory effort  Cardiovascular: Regular rate, no peripheral edema, no jugular venous distention  Gastrointestinal: Soft, mildly tender in LLQ, oblique incisional scar in right groin  Skin:  Warm, dry, no rash on visualized skin surfaces  Musculoskeletal: Symmetric strength, normal gait  Psychiatric: Alert and oriented ×3, normal affect     Zeny Laird PA-C    [facility] - General Surgery   [address], [address]  [place] [address]    [address], [address]  [place] [address]    Office: [phone]  Fax: [phone]   "

## 2024-02-07 ENCOUNTER — OFFICE VISIT (OUTPATIENT)
Dept: GASTROENTEROLOGY | Facility: CLINIC | Age: 54
End: 2024-02-07
Payer: COMMERCIAL

## 2024-02-07 VITALS
OXYGEN SATURATION: 99 % | TEMPERATURE: 97 F | WEIGHT: 196 LBS | HEART RATE: 81 BPM | DIASTOLIC BLOOD PRESSURE: 70 MMHG | SYSTOLIC BLOOD PRESSURE: 110 MMHG | BODY MASS INDEX: 28.06 KG/M2 | HEIGHT: 70 IN

## 2024-02-07 DIAGNOSIS — Z87.19 HISTORY OF DIVERTICULITIS: ICD-10-CM

## 2024-02-07 DIAGNOSIS — K58.0 IRRITABLE BOWEL SYNDROME WITH DIARRHEA: Chronic | ICD-10-CM

## 2024-02-07 DIAGNOSIS — K57.90 DIVERTICULOSIS: Primary | Chronic | ICD-10-CM

## 2024-02-07 DIAGNOSIS — Z12.11 COLON CANCER SCREENING: ICD-10-CM

## 2024-02-07 DIAGNOSIS — R10.32 LEFT LOWER QUADRANT ABDOMINAL PAIN: ICD-10-CM

## 2024-02-07 DIAGNOSIS — K63.5 POLYP OF COLON, UNSPECIFIED PART OF COLON, UNSPECIFIED TYPE: ICD-10-CM

## 2024-02-07 RX ORDER — DICYCLOMINE HYDROCHLORIDE 10 MG/1
10 CAPSULE ORAL 3 TIMES DAILY PRN
Qty: 90 CAPSULE | Refills: 11 | Status: SHIPPED | OUTPATIENT
Start: 2024-02-07

## 2024-02-07 NOTE — PATIENT INSTRUCTIONS
1.  For intermittent abdominal cramping, fecal urgency, loose stools may continue to use dicyclomine.    2.  For diverticulosis we recommend maintaining a high-fiber diet or use of a daily fiber supplement such as Citrucel, Metamucil, or Benefiber.  We recommend starting with 1 serving daily and increasing your dosing based upon how your bowel habits respond.  This can be purchased over-the-counter at your local grocery or pharmacy.    3.  Due to your history of colon polyps, your next colonoscopy will be due in 2 years, December 2025.    4.  Due to your significant history of diverticulitis we do recommend avoiding nuts, seeds, popcorn, peanuts etc.     5.  Office follow up as needed or annually if refills of dicyclomine are needed.

## 2024-02-07 NOTE — PROGRESS NOTES
"Chief Complaint   Patient presents with    Follow-up     Follow-up 2 after EGD and colonoscopy, history of diverticulitis, diverticulosis         History of Present Illness  53-year-old male presents the office today for follow-up.  He was last seen in office on 10/17/2023.  He has a history of acute uncomplicated sigmoid diverticulitis.  He was treated with a course of antibiotic therapy and prescribed dicyclomine.  This was his fourth episode of diverticulitis.  He underwent colonoscopy on 12/19/2023 revealing diverticulosis in the sigmoid and descending colon, a total of 3 colon polyps, granular mucosa in the sigmoid colon, nonbleeding internal hemorrhoids.  Cecum biopsy was noted to be a sessile serrated lesion.  Colon biopsy of the sigmoid was unremarkable.  Colonoscopy 2 to 3-year interval, December 2025 and patient is in recall accordingly.    Patient was referred to Dr. Delatorre following his colonoscopy due to large diverticula. Right now he prefers to defer surgical intervention. He reports that his Bms are regular. He has tried to introduce fiber supplements-which is going ok. He is having easier Bms. He denies straining or blood in his stool.     Result Review :       Office Visit with Nicole Lindsay APRN (10/17/2023)   COLONOSCOPY (12/19/2023 10:57)   Tissue Pathology Exam (12/19/2023 11:16)   CT Abdomen Pelvis With Contrast (07/24/2023 14:39)   Progress Notes by Zeny Laird PA-C (01/18/2024 08:15)     Vital Signs:   /70   Pulse 81   Temp 97 °F (36.1 °C)   Ht 177.8 cm (70\")   Wt 88.9 kg (196 lb)   SpO2 99%   BMI 28.12 kg/m²     Body mass index is 28.12 kg/m².     Physical Exam  Vitals reviewed.   Constitutional:       Appearance: Normal appearance.   Pulmonary:      Effort: Pulmonary effort is normal. No respiratory distress.   Abdominal:      General: Abdomen is flat. Bowel sounds are normal. There is no distension.      Palpations: Abdomen is soft. There is no mass.      Tenderness: " There is no abdominal tenderness. There is no guarding.   Musculoskeletal:         General: Normal range of motion.   Skin:     General: Skin is warm and dry.   Neurological:      General: No focal deficit present.      Mental Status: He is alert and oriented to person, place, and time.   Psychiatric:         Mood and Affect: Mood normal.         Behavior: Behavior normal.         Thought Content: Thought content normal.         Judgment: Judgment normal.       Assessment and Plan    Diagnoses and all orders for this visit:    1. Diverticulosis (Primary)  -     dicyclomine (BENTYL) 10 MG capsule; Take 1 capsule by mouth 3 (Three) Times a Day As Needed (abdominal pain/diarrhea). Take one tablet up to 3 times a day as needed for abdominal pain or diarrhea.  Dispense: 90 capsule; Refill: 11    2. Irritable bowel syndrome with diarrhea    3. History of diverticulitis    4. Left lower quadrant abdominal pain  Comments:  Resolved  Orders:  -     dicyclomine (BENTYL) 10 MG capsule; Take 1 capsule by mouth 3 (Three) Times a Day As Needed (abdominal pain/diarrhea). Take one tablet up to 3 times a day as needed for abdominal pain or diarrhea.  Dispense: 90 capsule; Refill: 11    5. Polyp of colon, unspecified part of colon, unspecified type    6. Colon cancer screening           Patient Instructions   1.  For intermittent abdominal cramping, fecal urgency, loose stools may continue to use dicyclomine.    2.  For diverticulosis we recommend maintaining a high-fiber diet or use of a daily fiber supplement such as Citrucel, Metamucil, or Benefiber.  We recommend starting with 1 serving daily and increasing your dosing based upon how your bowel habits respond.  This can be purchased over-the-counter at your local grocery or pharmacy.    3.  Due to your history of colon polyps, your next colonoscopy will be due in 2 years, December 2025.    4.  Due to your significant history of diverticulitis we do recommend avoiding nuts, seeds,  popcorn, peanuts etc.     5.  Office follow up as needed or annually if refills of dicyclomine are needed.       Discussion:    Overall patient's GI symptoms are in good control.  He has recently started a soluble fiber supplement which she will continue for management of diverticulosis.  He has had a significant history of diverticulitis and was seen and evaluated by general surgery.  Surgery was offered as an option but patient has deferred at this time.      Colonoscopy findings and pathology reviewed with patient today during his follow-up visit.  Due to a sessile serrated lesion, will place patient in recall for his next surveillance colonoscopy in 2 years.  We did refill his dicyclomine as he occasionally will use this to help manage his bowel habits.  Plan for office follow-up as needed or annually if medication refills are required.  Patient verbalized understanding of above plan of care and is in agreement.  All questions answered and support provided.    EMR Dragon/Transcription Disclaimer:  This document has been Dictated utilizing Dragon dictation.

## 2024-02-12 RX ORDER — VALSARTAN AND HYDROCHLOROTHIAZIDE 320; 25 MG/1; MG/1
1 TABLET, FILM COATED ORAL DAILY
Qty: 90 TABLET | Refills: 0 | Status: SHIPPED | OUTPATIENT
Start: 2024-02-12

## 2024-03-05 RX ORDER — AMOXICILLIN AND CLAVULANATE POTASSIUM 875; 125 MG/1; MG/1
1 TABLET, FILM COATED ORAL 2 TIMES DAILY
Qty: 20 TABLET | Refills: 0 | Status: SHIPPED | OUTPATIENT
Start: 2024-03-05 | End: 2024-03-15

## 2024-03-18 RX ORDER — ROSUVASTATIN CALCIUM 10 MG/1
10 TABLET, COATED ORAL DAILY
Qty: 90 TABLET | Refills: 0 | Status: SHIPPED | OUTPATIENT
Start: 2024-03-18

## 2024-05-12 RX ORDER — VALSARTAN AND HYDROCHLOROTHIAZIDE 320; 25 MG/1; MG/1
1 TABLET, FILM COATED ORAL DAILY
Qty: 90 TABLET | Refills: 0 | Status: SHIPPED | OUTPATIENT
Start: 2024-05-12

## 2024-06-10 RX ORDER — ROSUVASTATIN CALCIUM 10 MG/1
10 TABLET, COATED ORAL DAILY
Qty: 90 TABLET | Refills: 0 | Status: SHIPPED | OUTPATIENT
Start: 2024-06-10

## 2024-06-24 NOTE — TELEPHONE ENCOUNTER
Patient has not been evaluated by our office since September 2021.  I am happy to work him in today at 215 to further evaluate abdominal pain.  If unable to come in today and pain becomes worse recommend seeking further evaluation in ER.    Thank you,    TERRI Seals   Sent MW[facility] forms.

## 2024-07-31 ENCOUNTER — TRANSCRIBE ORDERS (OUTPATIENT)
Dept: ADMINISTRATIVE | Facility: HOSPITAL | Age: 54
End: 2024-07-31
Payer: COMMERCIAL

## 2024-07-31 DIAGNOSIS — Q61.02 MULTIPLE RENAL CYSTS: Primary | ICD-10-CM

## 2024-08-04 RX ORDER — VALSARTAN AND HYDROCHLOROTHIAZIDE 320; 25 MG/1; MG/1
1 TABLET, FILM COATED ORAL DAILY
Qty: 90 TABLET | Refills: 0 | Status: CANCELLED | OUTPATIENT
Start: 2024-08-04

## 2024-08-05 RX ORDER — VALSARTAN AND HYDROCHLOROTHIAZIDE 320; 25 MG/1; MG/1
1 TABLET, FILM COATED ORAL DAILY
Qty: 30 TABLET | Refills: 0 | Status: SHIPPED | OUTPATIENT
Start: 2024-08-05

## 2024-08-05 NOTE — TELEPHONE ENCOUNTER
Rx Refill Note  Requested Prescriptions     Pending Prescriptions Disp Refills    valsartan-hydrochlorothiazide (DIOVAN-HCT) 320-25 MG per tablet 90 tablet 0     Sig: Take 1 tablet by mouth Daily.      Last office visit with prescribing clinician: 6/29/2023   Next office visit with prescribing clinician: Visit date not found     Jose De Jesus Camacho MA  08/05/24, 08:14 EDT

## 2024-08-19 ENCOUNTER — HOSPITAL ENCOUNTER (OUTPATIENT)
Dept: ULTRASOUND IMAGING | Facility: HOSPITAL | Age: 54
Discharge: HOME | End: 2024-08-19
Admitting: INTERNAL MEDICINE
Payer: COMMERCIAL

## 2024-08-19 DIAGNOSIS — Q61.02 MULTIPLE RENAL CYSTS: ICD-10-CM

## 2024-08-19 PROCEDURE — 76775 US EXAM ABDO BACK WALL LIM: CPT

## 2024-09-08 RX ORDER — ROSUVASTATIN CALCIUM 10 MG/1
10 TABLET, COATED ORAL DAILY
Qty: 90 TABLET | Refills: 0 | Status: SHIPPED | OUTPATIENT
Start: 2024-09-08

## 2024-09-11 ENCOUNTER — OFFICE VISIT (OUTPATIENT)
Dept: FAMILY MEDICINE CLINIC | Facility: CLINIC | Age: 54
End: 2024-09-11
Payer: COMMERCIAL

## 2024-09-11 VITALS
BODY MASS INDEX: 28.49 KG/M2 | DIASTOLIC BLOOD PRESSURE: 71 MMHG | WEIGHT: 199 LBS | HEART RATE: 62 BPM | HEIGHT: 70 IN | OXYGEN SATURATION: 96 % | SYSTOLIC BLOOD PRESSURE: 106 MMHG

## 2024-09-11 DIAGNOSIS — R73.9 HYPERGLYCEMIA: ICD-10-CM

## 2024-09-11 DIAGNOSIS — E78.2 MIXED HYPERLIPIDEMIA: ICD-10-CM

## 2024-09-11 DIAGNOSIS — I10 PRIMARY HYPERTENSION: Primary | ICD-10-CM

## 2024-09-11 DIAGNOSIS — E55.9 VITAMIN D DEFICIENCY: ICD-10-CM

## 2024-09-11 DIAGNOSIS — Q61.3 POLYCYSTIC KIDNEY DISEASE: ICD-10-CM

## 2024-09-11 DIAGNOSIS — Z87.19 HISTORY OF DIVERTICULITIS: ICD-10-CM

## 2024-09-11 DIAGNOSIS — Z12.5 SCREENING PSA (PROSTATE SPECIFIC ANTIGEN): ICD-10-CM

## 2024-09-11 PROCEDURE — 99214 OFFICE O/P EST MOD 30 MIN: CPT | Performed by: FAMILY MEDICINE

## 2024-09-11 NOTE — PATIENT INSTRUCTIONS
Continue your current medications, diet, and regular exercise.  Return for fasting labs.  You should receive a call or my chart message with your test results. If you have not received your results in the next 7-10 days, please contact the office.    Continue routine follow-up with your nephrologist.

## 2024-09-11 NOTE — PROGRESS NOTES
"Chief Complaint  Hypertension    Subjective    History of Present Illness {  Problem List  Visit  Diagnosis   Encounters  Notes  Medications  Labs  Result Review Imaging  Media :23}     Jasbir Banks presents to [facility] for Hypertension.  History of Present Illness     He presents for follow up of hypertension.  He is currently stable on valsartan HCTZ 320 /25 mg daily.  His blood pressure outside the office is generally 120-135/70-80s.  He denies any headaches, dizziness, or chest pain. He has a history of polycystic kidney disease and sees the nephrologist every 6 months.  His last CMP was 6 weeks ago and his liver and kidney functions were normal.     He is also here for follow-up of hyperlipidemia.  He is currently on rosuvastatin 10 mg daily.  He reports good compliance and tolerability of the medication.  His last cholesterol was 169 with LDL of 99, HDL 42, and triglycerides of 162.  He is due recheck but is not fasting today.  He would also like to have his creatine kinase monitored as his dad has a history of CK elevation with statins.    He also has a history of hyperglycemia.  His last A1c was mildly elevated at 5.8.    He also has a history of vitamin D deficiency.  He is not currently on supplements.    He also has a history of recurrent diverticulitis and has seen GI and surgeon regarding flares.  He is trying to avoid surgical intervention.  Objective     Vital Signs:   /71   Pulse 62   Ht 177.8 cm (70\")   Wt 90.3 kg (199 lb)   SpO2 96%   BMI 28.55 kg/m²   Body mass index is 28.55 kg/m².     Physical Exam  Constitutional:       General: He is not in acute distress.  Cardiovascular:      Rate and Rhythm: Normal rate and regular rhythm.      Heart sounds: No murmur heard.  Pulmonary:      Effort: No respiratory distress.      Breath sounds: Normal breath sounds.   Neurological:      General: No focal deficit present.      Mental Status: " He is alert.   Psychiatric:         Behavior: Behavior normal.          Result Review  Data Reviewed:{ Labs  Result Review  Imaging  Med Tab  Media :23}                Assessment and Plan {CC Problem List  Visit Diagnosis  ROS  Review (Popup)  Health Maintenance  Quality  BestPractice  Medications  SmartSets  SnapShot Encounters  Media :23}   Diagnoses and all orders for this visit:    1. Primary hypertension (Primary)    2. Mixed hyperlipidemia  -     Lipid Panel; Future  -     CK; Future    3. Vitamin D deficiency  -     Vitamin D,25-Hydroxy; Future    4. History of diverticulitis    5. Polycystic kidney disease    6. Hyperglycemia  -     Hemoglobin A1c; Future    7. Screening PSA (prostate specific antigen)  -     PSA Screen; Future        Patient Instructions   Continue your current medications, diet, and regular exercise.  Return for fasting labs.  You should receive a call or my chart message with your test results. If you have not received your results in the next 7-10 days, please contact the office.    Continue routine follow-up with your nephrologist.     Patient was given instructions and counseling regarding his condition or for health maintenance advice on the AVS.       No follow-ups on file.    Jayde Rodrigues MD

## 2024-09-20 ENCOUNTER — LAB (OUTPATIENT)
Dept: FAMILY MEDICINE CLINIC | Facility: CLINIC | Age: 54
End: 2024-09-20
Payer: COMMERCIAL

## 2024-09-20 DIAGNOSIS — E78.2 MIXED HYPERLIPIDEMIA: ICD-10-CM

## 2024-09-20 DIAGNOSIS — Z12.5 SCREENING PSA (PROSTATE SPECIFIC ANTIGEN): ICD-10-CM

## 2024-09-20 DIAGNOSIS — E55.9 VITAMIN D DEFICIENCY: ICD-10-CM

## 2024-09-20 DIAGNOSIS — R73.9 HYPERGLYCEMIA: ICD-10-CM

## 2024-09-21 LAB
25(OH)D3+25(OH)D2 SERPL-MCNC: NORMAL NG/ML
CHOLEST SERPL-MCNC: NORMAL MG/DL
HBA1C MFR BLD: 5.8 % (ref 4.8–5.6)
HDLC SERPL-MCNC: NORMAL MG/DL
PSA SERPL-MCNC: NORMAL NG/ML
SPECIMEN STATUS: NORMAL
TRIGL SERPL-MCNC: NORMAL MG/DL
VLDLC SERPL CALC-MCNC: NORMAL MG/DL

## 2024-10-04 RX ORDER — VALSARTAN AND HYDROCHLOROTHIAZIDE 320; 25 MG/1; MG/1
1 TABLET, FILM COATED ORAL DAILY
Qty: 90 TABLET | Refills: 1 | Status: SHIPPED | OUTPATIENT
Start: 2024-10-04

## 2024-10-04 NOTE — TELEPHONE ENCOUNTER
Rx Refill Note  Requested Prescriptions     Pending Prescriptions Disp Refills    valsartan-hydrochlorothiazide (DIOVAN-HCT) 320-25 MG per tablet 30 tablet 0     Sig: Take 1 tablet by mouth Daily - due for visit      Last office visit with prescribing clinician: 9/11/2024   Last telemedicine visit with prescribing clinician: Visit date not found   Next office visit with prescribing clinician: Visit date not found       Dennis Young  10/04/24, 12:51 EDT

## 2024-12-06 RX ORDER — ROSUVASTATIN CALCIUM 10 MG/1
10 TABLET, COATED ORAL DAILY
Qty: 90 TABLET | Refills: 0 | Status: SHIPPED | OUTPATIENT
Start: 2024-12-06

## 2025-03-04 RX ORDER — ROSUVASTATIN CALCIUM 10 MG/1
10 TABLET, COATED ORAL DAILY
Qty: 90 TABLET | Refills: 0 | Status: SHIPPED | OUTPATIENT
Start: 2025-03-04

## 2025-03-04 NOTE — TELEPHONE ENCOUNTER
Rx Refill Note  Requested Prescriptions     Pending Prescriptions Disp Refills    rosuvastatin (CRESTOR) 10 MG tablet 90 tablet 0     Sig: Take 1 tablet by mouth Daily.      Last office visit with prescribing clinician: Visit date not found   Last telemedicine visit with prescribing clinician: Visit date not found   Next office visit with prescribing clinician: Visit date not found       Dennis Young  03/04/25, 10:10 EST

## 2025-03-31 RX ORDER — VALSARTAN AND HYDROCHLOROTHIAZIDE 320; 25 MG/1; MG/1
1 TABLET, FILM COATED ORAL DAILY
Qty: 90 TABLET | Refills: 0 | Status: SHIPPED | OUTPATIENT
Start: 2025-03-31

## 2025-03-31 NOTE — TELEPHONE ENCOUNTER
Rx Refill Note  Requested Prescriptions     Pending Prescriptions Disp Refills    valsartan-hydrochlorothiazide (DIOVAN-HCT) 320-25 MG per tablet 90 tablet 1     Sig: Take 1 tablet by mouth Daily.      Last office visit with prescribing clinician: Visit date not found   Last telemedicine visit with prescribing clinician: Visit date not found   Next office visit with prescribing clinician: Visit date not found       Dennis Young  03/31/25, 10:13 EDT

## 2025-06-02 ENCOUNTER — TRANSCRIBE ORDERS (OUTPATIENT)
Dept: ADMINISTRATIVE | Facility: HOSPITAL | Age: 55
End: 2025-06-02
Payer: COMMERCIAL

## 2025-06-02 DIAGNOSIS — N28.1 RENAL CYST: Primary | ICD-10-CM

## 2025-06-02 RX ORDER — ROSUVASTATIN CALCIUM 10 MG/1
10 TABLET, COATED ORAL DAILY
Qty: 90 TABLET | Refills: 0 | Status: SHIPPED | OUTPATIENT
Start: 2025-06-02

## 2025-06-27 ENCOUNTER — HOSPITAL ENCOUNTER (OUTPATIENT)
Dept: ULTRASOUND IMAGING | Facility: HOSPITAL | Age: 55
Discharge: HOME | End: 2025-06-27
Admitting: INTERNAL MEDICINE
Payer: COMMERCIAL

## 2025-06-27 DIAGNOSIS — N28.1 RENAL CYST: ICD-10-CM

## 2025-06-27 PROCEDURE — 76775 US EXAM ABDO BACK WALL LIM: CPT

## 2025-06-30 RX ORDER — VALSARTAN AND HYDROCHLOROTHIAZIDE 320; 25 MG/1; MG/1
1 TABLET, FILM COATED ORAL DAILY
Qty: 90 TABLET | Refills: 0 | Status: SHIPPED | OUTPATIENT
Start: 2025-06-30

## 2025-08-28 RX ORDER — ROSUVASTATIN CALCIUM 10 MG/1
10 TABLET, COATED ORAL DAILY
Qty: 90 TABLET | Refills: 0 | Status: SHIPPED | OUTPATIENT
Start: 2025-08-28

## (undated) DEVICE — ADAPT CLN SCPE ENDO PORPOISE BX/50 DISP

## (undated) DEVICE — LASSO POLYPECTOMY SNARE: Brand: LASSO

## (undated) DEVICE — FLEX ADVANTAGE 1500CC: Brand: FLEX ADVANTAGE

## (undated) DEVICE — VIAL FORMLN CAP 10PCT 20ML

## (undated) DEVICE — Device

## (undated) DEVICE — CANN O2 ETCO2 FITS ALL CONN CO2 SMPL A/ 7IN DISP LF

## (undated) DEVICE — KT ORCA ORCAPOD DISP STRL

## (undated) DEVICE — THE SINGLE USE ETRAP – POLYP TRAP IS USED FOR SUCTION RETRIEVAL OF ENDOSCOPICALLY REMOVED POLYPS.: Brand: ETRAP

## (undated) DEVICE — GOWN PROC ENDOARMOR GI LVL3 HY/SHLD UNIV